# Patient Record
Sex: FEMALE | Race: OTHER
[De-identification: names, ages, dates, MRNs, and addresses within clinical notes are randomized per-mention and may not be internally consistent; named-entity substitution may affect disease eponyms.]

---

## 2021-01-20 ENCOUNTER — HOSPITAL ENCOUNTER (INPATIENT)
Dept: HOSPITAL 72 - EMR | Age: 76
LOS: 3 days | DRG: 871 | End: 2021-01-23
Payer: MEDICARE

## 2021-01-20 VITALS — DIASTOLIC BLOOD PRESSURE: 57 MMHG | SYSTOLIC BLOOD PRESSURE: 110 MMHG

## 2021-01-20 VITALS — SYSTOLIC BLOOD PRESSURE: 116 MMHG | DIASTOLIC BLOOD PRESSURE: 67 MMHG

## 2021-01-20 VITALS — HEIGHT: 67 IN | BODY MASS INDEX: 27.15 KG/M2 | WEIGHT: 173 LBS

## 2021-01-20 VITALS — SYSTOLIC BLOOD PRESSURE: 160 MMHG | DIASTOLIC BLOOD PRESSURE: 89 MMHG

## 2021-01-20 VITALS — SYSTOLIC BLOOD PRESSURE: 151 MMHG | DIASTOLIC BLOOD PRESSURE: 71 MMHG

## 2021-01-20 VITALS — SYSTOLIC BLOOD PRESSURE: 154 MMHG | DIASTOLIC BLOOD PRESSURE: 84 MMHG

## 2021-01-20 VITALS — SYSTOLIC BLOOD PRESSURE: 153 MMHG | DIASTOLIC BLOOD PRESSURE: 85 MMHG

## 2021-01-20 VITALS — SYSTOLIC BLOOD PRESSURE: 129 MMHG | DIASTOLIC BLOOD PRESSURE: 72 MMHG

## 2021-01-20 VITALS — DIASTOLIC BLOOD PRESSURE: 92 MMHG | SYSTOLIC BLOOD PRESSURE: 135 MMHG

## 2021-01-20 VITALS — SYSTOLIC BLOOD PRESSURE: 102 MMHG | DIASTOLIC BLOOD PRESSURE: 64 MMHG

## 2021-01-20 VITALS — SYSTOLIC BLOOD PRESSURE: 110 MMHG | DIASTOLIC BLOOD PRESSURE: 65 MMHG

## 2021-01-20 VITALS — DIASTOLIC BLOOD PRESSURE: 57 MMHG | SYSTOLIC BLOOD PRESSURE: 119 MMHG

## 2021-01-20 VITALS — DIASTOLIC BLOOD PRESSURE: 54 MMHG | SYSTOLIC BLOOD PRESSURE: 141 MMHG

## 2021-01-20 VITALS — DIASTOLIC BLOOD PRESSURE: 76 MMHG | SYSTOLIC BLOOD PRESSURE: 143 MMHG

## 2021-01-20 VITALS — SYSTOLIC BLOOD PRESSURE: 140 MMHG | DIASTOLIC BLOOD PRESSURE: 74 MMHG

## 2021-01-20 DIAGNOSIS — I10: ICD-10-CM

## 2021-01-20 DIAGNOSIS — Z51.5: ICD-10-CM

## 2021-01-20 DIAGNOSIS — Z79.84: ICD-10-CM

## 2021-01-20 DIAGNOSIS — R65.21: ICD-10-CM

## 2021-01-20 DIAGNOSIS — E78.5: ICD-10-CM

## 2021-01-20 DIAGNOSIS — I25.2: ICD-10-CM

## 2021-01-20 DIAGNOSIS — L89.90: ICD-10-CM

## 2021-01-20 DIAGNOSIS — Z66: ICD-10-CM

## 2021-01-20 DIAGNOSIS — F03.90: ICD-10-CM

## 2021-01-20 DIAGNOSIS — J12.82: ICD-10-CM

## 2021-01-20 DIAGNOSIS — A41.89: Primary | ICD-10-CM

## 2021-01-20 DIAGNOSIS — U07.1: ICD-10-CM

## 2021-01-20 DIAGNOSIS — I45.10: ICD-10-CM

## 2021-01-20 DIAGNOSIS — J96.01: ICD-10-CM

## 2021-01-20 DIAGNOSIS — L89.96: ICD-10-CM

## 2021-01-20 LAB
ADD MANUAL DIFF: NO
ALBUMIN SERPL-MCNC: 1.7 G/DL (ref 3.4–5)
ALBUMIN/GLOB SERPL: 0.4 {RATIO} (ref 1–2.7)
ALP SERPL-CCNC: 100 U/L (ref 46–116)
ALT SERPL-CCNC: 19 U/L (ref 12–78)
ANION GAP SERPL CALC-SCNC: 6 MMOL/L (ref 5–15)
APPEARANCE UR: (no result)
APTT BLD: 28 SEC (ref 23–33)
APTT PPP: YELLOW S
AST SERPL-CCNC: 23 U/L (ref 15–37)
BASOPHILS NFR BLD AUTO: 0.4 % (ref 0–2)
BILIRUB SERPL-MCNC: 0.9 MG/DL (ref 0.2–1)
BUN SERPL-MCNC: 20 MG/DL (ref 7–18)
CALCIUM SERPL-MCNC: 8.5 MG/DL (ref 8.5–10.1)
CHLORIDE SERPL-SCNC: 103 MMOL/L (ref 98–107)
CK MB SERPL-MCNC: 2.1 NG/ML (ref 0–3.6)
CK SERPL-CCNC: 44 U/L (ref 26–308)
CO2 SERPL-SCNC: 27 MMOL/L (ref 21–32)
CREAT SERPL-MCNC: 0.8 MG/DL (ref 0.55–1.3)
EOSINOPHIL NFR BLD AUTO: 0.1 % (ref 0–3)
ERYTHROCYTE [DISTWIDTH] IN BLOOD BY AUTOMATED COUNT: 15.6 % (ref 11.6–14.8)
FERRITIN SERPL-MCNC: 660 NG/ML (ref 8–388)
GLOBULIN SER-MCNC: 4.7 G/DL
GLUCOSE UR STRIP-MCNC: NEGATIVE MG/DL
HCT VFR BLD CALC: 32.1 % (ref 37–47)
HGB BLD-MCNC: 10.1 G/DL (ref 12–16)
INR PPP: 1.1 (ref 0.9–1.1)
KETONES UR QL STRIP: NEGATIVE
LDH SERPL L TO P-CCNC: 309 U/L (ref 81–234)
LEUKOCYTE ESTERASE UR QL STRIP: (no result)
LYMPHOCYTES NFR BLD AUTO: 14.1 % (ref 20–45)
MCV RBC AUTO: 89 FL (ref 80–99)
MONOCYTES NFR BLD AUTO: 6.4 % (ref 1–10)
NEUTROPHILS NFR BLD AUTO: 79 % (ref 45–75)
NITRITE UR QL STRIP: NEGATIVE
PH UR STRIP: 6.5 [PH] (ref 4.5–8)
PLATELET # BLD: 281 K/UL (ref 150–450)
POTASSIUM SERPL-SCNC: 4.4 MMOL/L (ref 3.5–5.1)
PROT UR QL STRIP: (no result)
RBC # BLD AUTO: 3.63 M/UL (ref 4.2–5.4)
SODIUM SERPL-SCNC: 136 MMOL/L (ref 136–145)
SP GR UR STRIP: 1.01 (ref 1–1.03)
UROBILINOGEN UR-MCNC: 4 MG/DL (ref 0–1)
WBC # BLD AUTO: 13.3 K/UL (ref 4.8–10.8)

## 2021-01-20 PROCEDURE — 74018 RADEX ABDOMEN 1 VIEW: CPT

## 2021-01-20 PROCEDURE — 87181 SC STD AGAR DILUTION PER AGT: CPT

## 2021-01-20 PROCEDURE — 83550 IRON BINDING TEST: CPT

## 2021-01-20 PROCEDURE — 99291 CRITICAL CARE FIRST HOUR: CPT

## 2021-01-20 PROCEDURE — 93005 ELECTROCARDIOGRAM TRACING: CPT

## 2021-01-20 PROCEDURE — 87081 CULTURE SCREEN ONLY: CPT

## 2021-01-20 PROCEDURE — 94003 VENT MGMT INPAT SUBQ DAY: CPT

## 2021-01-20 PROCEDURE — 85007 BL SMEAR W/DIFF WBC COUNT: CPT

## 2021-01-20 PROCEDURE — 86900 BLOOD TYPING SEROLOGIC ABO: CPT

## 2021-01-20 PROCEDURE — 82728 ASSAY OF FERRITIN: CPT

## 2021-01-20 PROCEDURE — 93306 TTE W/DOPPLER COMPLETE: CPT

## 2021-01-20 PROCEDURE — 83690 ASSAY OF LIPASE: CPT

## 2021-01-20 PROCEDURE — 83605 ASSAY OF LACTIC ACID: CPT

## 2021-01-20 PROCEDURE — 80053 COMPREHEN METABOLIC PANEL: CPT

## 2021-01-20 PROCEDURE — 80048 BASIC METABOLIC PNL TOTAL CA: CPT

## 2021-01-20 PROCEDURE — 86920 COMPATIBILITY TEST SPIN: CPT

## 2021-01-20 PROCEDURE — 96375 TX/PRO/DX INJ NEW DRUG ADDON: CPT

## 2021-01-20 PROCEDURE — 83615 LACTATE (LD) (LDH) ENZYME: CPT

## 2021-01-20 PROCEDURE — 0BH17EZ INSERTION OF ENDOTRACHEAL AIRWAY INTO TRACHEA, VIA NATURAL OR ARTIFICIAL OPENING: ICD-10-PCS

## 2021-01-20 PROCEDURE — 5A09357 ASSISTANCE WITH RESPIRATORY VENTILATION, LESS THAN 24 CONSECUTIVE HOURS, CONTINUOUS POSITIVE AIRWAY PRESSURE: ICD-10-PCS

## 2021-01-20 PROCEDURE — 85610 PROTHROMBIN TIME: CPT

## 2021-01-20 PROCEDURE — 87040 BLOOD CULTURE FOR BACTERIA: CPT

## 2021-01-20 PROCEDURE — 85379 FIBRIN DEGRADATION QUANT: CPT

## 2021-01-20 PROCEDURE — 85730 THROMBOPLASTIN TIME PARTIAL: CPT

## 2021-01-20 PROCEDURE — 83540 ASSAY OF IRON: CPT

## 2021-01-20 PROCEDURE — 83735 ASSAY OF MAGNESIUM: CPT

## 2021-01-20 PROCEDURE — 36415 COLL VENOUS BLD VENIPUNCTURE: CPT

## 2021-01-20 PROCEDURE — 87086 URINE CULTURE/COLONY COUNT: CPT

## 2021-01-20 PROCEDURE — 84484 ASSAY OF TROPONIN QUANT: CPT

## 2021-01-20 PROCEDURE — 84100 ASSAY OF PHOSPHORUS: CPT

## 2021-01-20 PROCEDURE — 83880 ASSAY OF NATRIURETIC PEPTIDE: CPT

## 2021-01-20 PROCEDURE — 81003 URINALYSIS AUTO W/O SCOPE: CPT

## 2021-01-20 PROCEDURE — 82550 ASSAY OF CK (CPK): CPT

## 2021-01-20 PROCEDURE — 82803 BLOOD GASES ANY COMBINATION: CPT

## 2021-01-20 PROCEDURE — 71045 X-RAY EXAM CHEST 1 VIEW: CPT

## 2021-01-20 PROCEDURE — 5A1945Z RESPIRATORY VENTILATION, 24-96 CONSECUTIVE HOURS: ICD-10-PCS

## 2021-01-20 PROCEDURE — 86850 RBC ANTIBODY SCREEN: CPT

## 2021-01-20 PROCEDURE — 86901 BLOOD TYPING SEROLOGIC RH(D): CPT

## 2021-01-20 PROCEDURE — 86140 C-REACTIVE PROTEIN: CPT

## 2021-01-20 PROCEDURE — 86710 INFLUENZA VIRUS ANTIBODY: CPT

## 2021-01-20 PROCEDURE — 82553 CREATINE MB FRACTION: CPT

## 2021-01-20 PROCEDURE — 96365 THER/PROPH/DIAG IV INF INIT: CPT

## 2021-01-20 PROCEDURE — 96367 TX/PROPH/DG ADDL SEQ IV INF: CPT

## 2021-01-20 PROCEDURE — 94002 VENT MGMT INPAT INIT DAY: CPT

## 2021-01-20 PROCEDURE — 85025 COMPLETE CBC W/AUTO DIFF WBC: CPT

## 2021-01-20 RX ADMIN — DEXTROSE AND SODIUM CHLORIDE SCH MLS/HR: 5; .45 INJECTION, SOLUTION INTRAVENOUS at 15:51

## 2021-01-20 NOTE — NUR
NURSE NOTES:

I spoke to Awa Claudio she wants full code patient,to do everything to save her,I 
notified Dr. Bruno patients condition ,tacypneic,condition unstable,O2 saturation 40 %,per 
Dr. Bruno if ABG result needs intubation,intubate patient and transfer ICU,RRT was called 
[facility] ICU RN called ED doctor to intubate patient,notified Doctor Damion Awa Gonzales 
wants full code

-------------------------------------------------------------------------------

Addendum: 01/20/21 at 1720 by Layla Tomas RN

-------------------------------------------------------------------------------

Awa Gonzales notified patients condition

## 2021-01-20 NOTE — NUR
TRANSFER TO FLOOR:



Patient transferred to SDU via gurney, accompanied by 2 RNs. Pt is on BIPAP, 
tolerating well, not in any distress. IV line on right forearm 20g patent adn 
intact. Swabs are sent. Pt has no belongings.

## 2021-01-20 NOTE — NUR
NURSE NOTES:

RT came at bedside and titrated FiO2 from 100% to 80%. O2 saturation 100%. will monitor 
closely

## 2021-01-20 NOTE — CONSULTATION
Consult Note


Consult Note


DATE OF CONSULTATION: 1/20/2021


CONSULTING PHYSICIAN:  Albino Ventura MD.





ATTENDING PHYSICIAN: Dr. Bruno





REASON FOR CONSULTATION: COVID-19, bilateral infiltrates on chest x-ray





HISTORY OF PRESENT ILLNESS: This is a 75-year-old female who was sent to ED from

[facility] for evaluation of increased difficulty breathing and hypoxia.  She has been 

sent in from [facility].  She has medical history of unstageable 

pressure sores, hypertension, dementia, psychosis and hyperlipidemia.  Further 

history is limited given her underlying psychiatric illnesses.  Much of the 

information is obtained from ER note and EMR.


It was reported that she tested positive for COVID-19 infection 10 days ago.  

She arrived hypoxic despite placement of nonrebreather by EMS during 

transportation.  Currently she is on BiPAP saturating at 100%.  Her ABG shows 

respiratory alkalosis.





Her chest x-ray is notable for bilateral infiltrates.


She was given IV fluids and IV antibiotics empirically.  She was also given 

Decadron given hypoxia.  He is now admitted to the hospital.





 PAST MEDICAL HISTORY: Unstageable pressure sores, hypertension, dementia, 

psychosis, and hyperlipidemia





MEDICATIONS: Alprazolam, amlodipine, atorvastatin, citalopram, donezepil, 

Metformin, olanzapine





ALLERGIES: No known allergies





FAMILY HISTORY: Unknown





PERSONAL/SOCIAL HISTORY: Resident of [facility]





REVIEW OF SYSTEMS: Unreliable





PHYSICAL EXAMINATION:


VITAL SIGNS:  Blood pressure 143/76, heart rate 84, respiratory rate 23,


weight 93 kg, height 170 cm


General: Patient appears NAD on BiPAP, with normal work of breathing


HEENT:  Head exam reveals that the head is normocephalic, atraumatic


without deformity or unusual swelling.  Pupils are PERRLA.


CHEST AND LUNGS: Rhonchi noted CARDIOVASCULAR:  Reveals normal S1, S2 without 

murmurs, rubs, or clicks.


ABDOMEN:  Soft with no tenderness or organomegaly.


RECTAL:  Deferred.


MUSCULOSKELETAL:  There is no tenderness to palpation.  Range of motion is


normal.


NEUROLOGICAL:  Alert and oriented x3 , nonfocal





LABORATORY DATA:  


Laboratory testing shows WBC 13.3, hemoglobin 10.1, hematocrit 32.1.





Chemistries show BUN 20, glucose 154, ferritin 660, , troponin 0.059, CRP

14.0, BNP 8374, albumin 1.7


Urinalysis shows 2+ protein, 4+ blood, 1+ leuk esterase, presence of yeast and 

bacteria in urine


ABG shows pH 7.5, PCO2 33.9, PO2 74.3


Assessment/Plan


1. COVID-19 pneumonia with hypoxia


   -We will continue broad-spectrum antibiotics for pneumonia coverage


   -We will continue with dexamethasone given hypoxia


   -Currently on BiPAP saturating at 100%


   


2. Elevated inflammatory markers


   -We will initiate Lovenox for DVT prophylaxis


3. Respiratory alkalosis


   -Now on BiPAP


   -Check ABG and labs


Leukocytosis


Anemia


Hyperglycemia


Troponin leak


Elevated BNP


OTTO


    - Management per primary MD





DNR/DNI





Now in [facility]ORI


The care for this patient was discussed with my supervising physician.


Time spent for this case was approximately 31 minutes.











Ankit Todd                 Jan 20, 2021 12:09

## 2021-01-20 NOTE — NUR
NURSE NOTES:

contacted Dr. Bruno and updated regarding patient's ABG. PH 7.479, PCO2 28.3, PO2 163.2, 
HCO3 20.6. Also updated patient's vent settings AC 16  FiO2 80% PEEP 5. Dr. Bruno 
aware and acknowledged. Ordered to change TV from 500 to 400 and FiO2 from 80% to 70%. will 
carry out orders.

## 2021-01-20 NOTE — CARDIAC ELECTROPHYSIOLOGY PN
Subjective


Subjective


[id number]





Objective





Last 24 Hour Vital Signs








  Date Time  Temp Pulse Resp B/P (MAP) Pulse Ox O2 Delivery O2 Flow Rate FiO2


 


1/20/21 11:58  75      


 


1/20/21 11:20  67 17  97 Bi-Pap  100


 


1/20/21 11:20  67 17  97   100


 


1/20/21 11:15 98.3 84 23 143/76 100 Bi-pap 15.0 100


 


1/20/21 11:02 98.3 84 23 143/76 100 Bi-pap 15.0 100


 


1/20/21 07:35 98.3 91 22 151/71 100 Bi-pap  100


 


1/20/21 07:23  95 23  99   100


 


1/20/21 06:54 98.3 94 25 154/84 100 Room Air  100


 


1/20/21 05:42 98.4 118 28 160/89 100 Bi-pap  100


 


1/20/21 04:38 98.6 115 30 141/54 100 Bi-pap  


 


1/20/21 04:11 98.4       


 


1/20/21 04:00  115 26  97   100


 


1/20/21 03:54 99.4 110 36 153/85 92 Non-Rebreather 15.0 


 


1/20/21 03:54  110 36   Non-Rebreather 15.0 


 


1/20/21 03:23 99.7 130 28  90 Non-Rebreather  

















Intake and Output  


 


 1/19/21 1/20/21





 19:00 07:00


 


Intake Total  610 ml


 


Balance  610 ml


 


  


 


Intake IV Total  610 ml











Laboratory Tests








Test


 1/20/21


03:45 1/20/21


03:47 1/20/21


14:45


 


White Blood Count


 13.3 K/UL


(4.8-10.8)  H 


 





 


Red Blood Count


 3.63 M/UL


(4.20-5.40)  L 


 





 


Hemoglobin


 10.1 G/DL


(12.0-16.0)  L 


 





 


Hematocrit


 32.1 %


(37.0-47.0)  L 


 





 


Mean Corpuscular Volume 89 FL (80-99)    


 


Mean Corpuscular Hemoglobin


 27.9 PG


(27.0-31.0) 


 





 


Mean Corpuscular Hemoglobin


Concent 31.5 G/DL


(32.0-36.0)  L 


 





 


Red Cell Distribution Width


 15.6 %


(11.6-14.8)  H 


 





 


Platelet Count


 281 K/UL


(150-450) 


 





 


Mean Platelet Volume


 7.2 FL


(6.5-10.1) 


 





 


Neutrophils (%) (Auto)


 79.0 %


(45.0-75.0)  H 


 





 


Lymphocytes (%) (Auto)


 14.1 %


(20.0-45.0)  L 


 





 


Monocytes (%) (Auto)


 6.4 %


(1.0-10.0) 


 





 


Eosinophils (%) (Auto)


 0.1 %


(0.0-3.0) 


 





 


Basophils (%) (Auto)


 0.4 %


(0.0-2.0) 


 





 


Prothrombin Time


 12.0 SEC


(9.30-11.50)  H 


 





 


Prothromb Time International


Ratio 1.1 (0.9-1.1)  


 


 





 


Activated Partial


Thromboplast Time 28 SEC (23-33)


 


 





 


D-Dimer


 6.00 mg/L FEU


(0.00-0.49)  H 


 





 


Urine Color Yellow    


 


Urine Appearance


 Slightly


cloudy 


 





 


Urine pH 6.5 (4.5-8.0)    


 


Urine Specific Gravity


 1.015


(1.005-1.035) 


 





 


Urine Protein


 2+ (NEGATIVE)


H 


 





 


Urine Glucose (UA)


 Negative


(NEGATIVE) 


 





 


Urine Ketones


 Negative


(NEGATIVE) 


 





 


Urine Blood


 4+ (NEGATIVE)


H 


 





 


Urine Nitrite


 Negative


(NEGATIVE) 


 





 


Urine Bilirubin


 Negative


(NEGATIVE) 


 





 


Urine Urobilinogen


 4 MG/DL


(0.0-1.0)  H 


 





 


Urine Leukocyte Esterase


 1+ (NEGATIVE)


H 


 





 


Urine RBC


 20-30 /HPF (0


- 2)  H 


 





 


Urine WBC


 5-10 /HPF (0 -


2)  H 


 





 


Urine Squamous Epithelial


Cells Moderate /LPF


(NONE/OCC)  H 


 





 


Urine Bacteria


 Moderate /HPF


(NONE)  H 


 





 


Urine Yeast


 Many /HPF


(NONE)  H 


 





 


Sodium Level


 136 MMOL/L


(136-145) 


 





 


Potassium Level


 4.4 MMOL/L


(3.5-5.1) 


 





 


Chloride Level


 103 MMOL/L


() 


 





 


Carbon Dioxide Level


 27 MMOL/L


(21-32) 


 





 


Anion Gap


 6 mmol/L


(5-15) 


 





 


Blood Urea Nitrogen


 20 mg/dL


(7-18)  H 


 





 


Creatinine


 0.8 MG/DL


(0.55-1.30) 


 





 


Estimat Glomerular Filtration


Rate > 60 mL/min


(>60) 


 





 


Glucose Level


 154 MG/DL


()  H 


 





 


Lactic Acid Level


 1.40 mmol/L


(0.4-2.0) 


 





 


Calcium Level


 8.5 MG/DL


(8.5-10.1) 


 





 


Magnesium Level


 1.5 MG/DL


(1.8-2.4)  L 


 Pending  





 


Ferritin


 660 NG/ML


(8-388)  H 


 





 


Total Bilirubin


 0.9 MG/DL


(0.2-1.0) 


 





 


Aspartate Amino Transf


(AST/SGOT) 23 U/L (15-37)


 


 





 


Alanine Aminotransferase


(ALT/SGPT) 19 U/L (12-78)


 


 





 


Alkaline Phosphatase


 100 U/L


() 


 





 


Lactate Dehydrogenase


 309 U/L


()  H 


 





 


Total Creatine Kinase


 44 U/L


() 


 





 


Creatine Kinase MB


 2.1 NG/ML


(0.0-3.6) 


 





 


Creatine Kinase MB Relative


Index 4.7  


 


 





 


Troponin I


 0.059 ng/mL


(0.000-0.056) 


 





 


C-Reactive Protein,


Quantitative 14.0 mg/dL


(0.00-0.90)  H 


 





 


Pro-B-Type Natriuretic Peptide


 8374 pg/mL


(0-125)  H 


 





 


Total Protein


 6.4 G/DL


(6.4-8.2) 


 





 


Albumin


 1.7 G/DL


(3.4-5.0)  L 


 





 


Globulin 4.7 g/dL    


 


Albumin/Globulin Ratio


 0.4 (1.0-2.7)


L 


 





 


Lipase


 252 U/L


() 


 





 


Arterial Blood pH


 


 7.500


(7.350-7.450) 





 


Arterial Blood Partial


Pressure CO2 


 33.9 mmHg


(35.0-45.0)  L 





 


Arterial Blood Partial


Pressure O2 


 74.3 mmHg


(75.0-100.0)  L 





 


Arterial Blood HCO3


 


 25.8 mmol/L


(22.0-26.0) 





 


Arterial Blood Oxygen


Saturation 


 94.4 %


()  L 





 


Arterial Blood Base Excess  2.8 (-2-2)  H 


 


Steve Test  Positive   











Microbiology








 Date/Time


Source Procedure


Growth Status





 


 1/20/21 03:45


Nasal Nares - Final Complete


 


 1/20/21 03:45


Nasal Nares - Final Complete

















Meño Capone MD               Jan 20, 2021 14:57

## 2021-01-20 NOTE — NUR
NURSE NOTES:

Pt O2 saturation decrease to 89. RT was called to assess the pt. BP is 135/92, , RR 
was 36. Pt was hard to awaken. Dr. Bruno was contacted and informed. RRT was called. ED MD 
intubated the pt. Pt was then transferred to ICU. Gave report to Amy España. Endorsed plan of 
care.

## 2021-01-20 NOTE — NUR
ED Nurse Note: Pt is resting comfortably, eyes closed, breathing is much less 
labored with the bipap, frequent PVCs on tele monitor, ER MD aware, started Mag 
infusion. Vitals stable as documented.

## 2021-01-20 NOTE — CONSULTATION
DATE OF CONSULTATION:  01/20/2021

CARDIOLOGY CONSULTATION



CONSULTING PHYSICIAN:  Meño Capone M.D.



REASON FOR CONSULTATION:  Tachycardia and bundle-branch block and old

inferior wall MI in a patient with COVID pneumonia.



HISTORY OF PRESENT ILLNESS:  The patient is a 75-year-old lady who was

brought in from a nursing home for desaturation from [facility].  The patient has prior history of coronavirus infection about 10

days ago and had diminished oxygen saturation and was started on

nonrebreather by the paramedics.  The patient also has dementia,

psychosis, and hyperlipidemia.  The patient also is noted to be

tachycardic with heart rate of 114 and a cardiology consultation was

obtained for further evaluation.



REVIEW OF SYSTEMS:  Cannot be obtained.



PAST MEDICAL HISTORY:  As mentioned above.



FAMILY HISTORY:  Noncontributory.



SOCIAL HISTORY:  Lives in a nursing home.



PHYSICAL EXAMINATION:

VITAL SIGNS:  Blood pressure of 143/76, pulse is 70, respirations 18, and

temperature 98.3.

HEAD AND NECK:  No JVD.

LUNGS:  Coarse rhonchi.

CARDIOVASCULAR:  Regular S1 and S2 with no gallop.

ABDOMEN:  Soft.

EXTREMITIES:  No pitting edema.



LABORATORY AND DIAGNOSTIC DATA:  Labs show white count of 13.2, hemoglobin

of 10, hematocrit 32, and platelet count of 281.  Sodium _____, potassium

4.4, BUN of 20, creatinine of 0.8.  Troponin 0.059.



ASSESSMENT AND PLAN:

1. Elevated troponin of 0.059.  The patient does not have renal failure.

This could be due to COVID pneumonia.  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

myocardial infarction and right bundle-branch block.  I will start the

patient on aspirin and metoprolol and Lipitor.  I will get an

echocardiogram for further evaluation.

2. Right bundle-branch block.

3. Sinus tachycardia at 113 due to the patient's COVID pneumonia.

4. COVID pneumonia, currently on BiPAP as well as dexamethasone,

azithromycin, and ceftriaxone.



Thank you very much for allowing me to participate in the care of this

patient.  Please do not hesitate to contact me if you have any questions

regarding my evaluation.









  ______________________________________________

  Meño Capone M.D.





DR:  GASTON

D:  01/20/2021 14:59

T:  01/20/2021 20:15

JOB#:  [id number]

CC:

## 2021-01-20 NOTE — HISTORY & PHYSICAL
History of Present Illness


General


Reason for Hospitalization:  Dyspnea/Respdistress





Present Illness


HPI


75-year-old female who was sent to ED from SNF for evaluation of increased 

difficulty breathing and hypoxia.  She has been sent in from [facility].  She has medical history of unstageable pressure sores, hypertension, 

dementia, psychosis and hyperlipidemia.  Further history is limited given her 

underlying psychiatric illnesses.  Much of the information is obtained from ER 

note and EMR.


It was reported that she tested positive for COVID-19 infection 10 days ago.  

She arrived hypoxic despite placement of nonrebreather by EMS during 

transportation.  Currently she is on BiPAP saturating at 100%.  Her ABG shows 

respiratory alkalosis.





Her chest x-ray is notable for bilateral infiltrates.


She was given IV fluids and IV antibiotics empirically.  She was also given 

Decadron given hypoxia.  He is now admitted to the hospital.





 PAST MEDICAL HISTORY: Unstageable pressure sores, hypertension, dementia, 

psychosis, and hyperlipidemia





MEDICATIONS: Alprazolam, amlodipine, atorvastatin, citalopram, donezepil, 

Metformin, olanzapine





ALLERGIES: No known allergies





FAMILY HISTORY: Unknown





PERSONAL/SOCIAL HISTORY: Resident of [facility]





REVIEW OF SYSTEMS: Unreliable


Allergies:  


Coded Allergies:  


     No Known Allergies (Unverified , 1/20/21)





COVID-19 Screening


Contact w/high risk pt:  Yes


Experienced COVID-19 symptoms?:  Yes


Coronavirus symptoms experienc:  Shortness of Breath





Medication History


Scheduled


Amlodipine Besylate* (Amlodipine Besylate*), 5 MG ORAL DAILY, (Reported)


Atorvastatin Calcium* (Atorvastatin Calcium*), 10 MG ORAL BEDTIME, (Reported)


Citalopram Hydrobromide* (Citalopram Hbr*), 20 MG ORAL DAILY, (Reported)


Donepezil Hcl* (Donepezil Hcl*), 5 MG ORAL DAILY, (Reported)


Metformin Hcl* (Metformin Hcl*), 500 MG ORAL TWICE A DAY, (Reported)


Olanzapine Zydis* (Zyprexa Zydis*), 5 MG ORAL BEDTIME, (Reported)





Scheduled PRN


Alprazolam* (Xanax*), 0.25 MG ORAL BEDTIME PRN for PRN Agitation/Anxiety, 

(Reported)





Patient History


Healthcare decision maker





Resuscitation status





Advanced Directive on File








Review of Systems


Review of Symptoms


unable to obtain due to clinical condition





Physical Exam


Physical Exam


General appearance:  intubated 


Head:  Normocephalic, without obvious abnormality, atraumatic


Eyes:  conjunctivae/corneas clear. PERRL, EOM's intac


Throat:  Lips, mucosa, and tongue normal. Teeth and gums normal


Neck:  supple, symmetrical, trachea midline, no adenopathy, thyroid: not 

enlarged, symmetric, no tenderness/mass/nodules, no carotid bruit and no JVD


Lungs:  clear to auscultation bilaterally


Heart:  regular rate and rhythm, S1, S2 normal, no murmur, click, rub or gallop


Abdomen:  soft, non-tender. Bowel sounds normal. No masses,  no organomegaly


Extremities:  extremities normal, atraumatic, no cyanosis or edema


Pulses:  2+ and symmetric





Last 24 Hour Vital Signs








  Date Time  Temp Pulse Resp B/P (MAP) Pulse Ox O2 Delivery O2 Flow Rate FiO2


 


1/20/21 07:35 98.3 91 22 151/71 100 Bi-pap  100


 


1/20/21 07:23  95 23  99   100


 


1/20/21 06:54 98.3 94 25 154/84 100 Room Air  100


 


1/20/21 05:42 98.4 118 28 160/89 100 Bi-pap  100


 


1/20/21 04:38 98.6 115 30 141/54 100 Bi-pap  


 


1/20/21 04:11 98.4       


 


1/20/21 04:00  115 26  97   100


 


1/20/21 03:54 99.4 110 36 153/85 92 Non-Rebreather 15.0 


 


1/20/21 03:54  110 36   Non-Rebreather 15.0 


 


1/20/21 03:23 99.7 130 28  90 Non-Rebreather  

















Intake and Output  


 


 1/19/21 1/20/21





 19:00 07:00


 


Intake Total  610 ml


 


Balance  610 ml


 


  


 


Intake IV Total  610 ml











Laboratory Tests








Test


 1/20/21


03:45 1/20/21


03:47


 


White Blood Count


 13.3 K/UL


(4.8-10.8)  H 





 


Red Blood Count


 3.63 M/UL


(4.20-5.40)  L 





 


Hemoglobin


 10.1 G/DL


(12.0-16.0)  L 





 


Hematocrit


 32.1 %


(37.0-47.0)  L 





 


Mean Corpuscular Volume 89 FL (80-99)   


 


Mean Corpuscular Hemoglobin


 27.9 PG


(27.0-31.0) 





 


Mean Corpuscular Hemoglobin


Concent 31.5 G/DL


(32.0-36.0)  L 





 


Red Cell Distribution Width


 15.6 %


(11.6-14.8)  H 





 


Platelet Count


 281 K/UL


(150-450) 





 


Mean Platelet Volume


 7.2 FL


(6.5-10.1) 





 


Neutrophils (%) (Auto)


 79.0 %


(45.0-75.0)  H 





 


Lymphocytes (%) (Auto)


 14.1 %


(20.0-45.0)  L 





 


Monocytes (%) (Auto)


 6.4 %


(1.0-10.0) 





 


Eosinophils (%) (Auto)


 0.1 %


(0.0-3.0) 





 


Basophils (%) (Auto)


 0.4 %


(0.0-2.0) 





 


Prothrombin Time


 12.0 SEC


(9.30-11.50)  H 





 


Prothromb Time International


Ratio 1.1 (0.9-1.1)  


 





 


Activated Partial


Thromboplast Time 28 SEC (23-33)


 





 


D-Dimer


 6.00 mg/L FEU


(0.00-0.49)  H 





 


Urine Color Yellow   


 


Urine Appearance


 Slightly


cloudy 





 


Urine pH 6.5 (4.5-8.0)   


 


Urine Specific Gravity


 1.015


(1.005-1.035) 





 


Urine Protein


 2+ (NEGATIVE)


H 





 


Urine Glucose (UA)


 Negative


(NEGATIVE) 





 


Urine Ketones


 Negative


(NEGATIVE) 





 


Urine Blood


 4+ (NEGATIVE)


H 





 


Urine Nitrite


 Negative


(NEGATIVE) 





 


Urine Bilirubin


 Negative


(NEGATIVE) 





 


Urine Urobilinogen


 4 MG/DL


(0.0-1.0)  H 





 


Urine Leukocyte Esterase


 1+ (NEGATIVE)


H 





 


Urine RBC


 20-30 /HPF (0


- 2)  H 





 


Urine WBC


 5-10 /HPF (0 -


2)  H 





 


Urine Squamous Epithelial


Cells Moderate /LPF


(NONE/OCC)  H 





 


Urine Bacteria


 Moderate /HPF


(NONE)  H 





 


Urine Yeast


 Many /HPF


(NONE)  H 





 


Sodium Level


 136 MMOL/L


(136-145) 





 


Potassium Level


 4.4 MMOL/L


(3.5-5.1) 





 


Chloride Level


 103 MMOL/L


() 





 


Carbon Dioxide Level


 27 MMOL/L


(21-32) 





 


Anion Gap


 6 mmol/L


(5-15) 





 


Blood Urea Nitrogen


 20 mg/dL


(7-18)  H 





 


Creatinine


 0.8 MG/DL


(0.55-1.30) 





 


Estimat Glomerular Filtration


Rate > 60 mL/min


(>60) 





 


Glucose Level


 154 MG/DL


()  H 





 


Lactic Acid Level


 1.40 mmol/L


(0.4-2.0) 





 


Calcium Level


 8.5 MG/DL


(8.5-10.1) 





 


Magnesium Level


 1.5 MG/DL


(1.8-2.4)  L 





 


Ferritin


 660 NG/ML


(8-388)  H 





 


Total Bilirubin


 0.9 MG/DL


(0.2-1.0) 





 


Aspartate Amino Transf


(AST/SGOT) 23 U/L (15-37)


 





 


Alanine Aminotransferase


(ALT/SGPT) 19 U/L (12-78)


 





 


Alkaline Phosphatase


 100 U/L


() 





 


Lactate Dehydrogenase


 309 U/L


()  H 





 


Total Creatine Kinase


 44 U/L


() 





 


Creatine Kinase MB


 2.1 NG/ML


(0.0-3.6) 





 


Creatine Kinase MB Relative


Index 4.7  


 





 


Troponin I


 0.059 ng/mL


(0.000-0.056) 





 


C-Reactive Protein,


Quantitative 14.0 mg/dL


(0.00-0.90)  H 





 


Pro-B-Type Natriuretic Peptide


 8374 pg/mL


(0-125)  H 





 


Total Protein


 6.4 G/DL


(6.4-8.2) 





 


Albumin


 1.7 G/DL


(3.4-5.0)  L 





 


Globulin 4.7 g/dL   


 


Albumin/Globulin Ratio


 0.4 (1.0-2.7)


L 





 


Lipase


 252 U/L


() 





 


Arterial Blood pH


 


 7.500


(7.350-7.450)


 


Arterial Blood Partial


Pressure CO2 


 33.9 mmHg


(35.0-45.0)  L


 


Arterial Blood Partial


Pressure O2 


 74.3 mmHg


(75.0-100.0)  L


 


Arterial Blood HCO3


 


 25.8 mmol/L


(22.0-26.0)


 


Arterial Blood Oxygen


Saturation 


 94.4 %


()  L


 


Arterial Blood Base Excess  2.8 (-2-2)  H


 


Steve Test  Positive  











Microbiology








 Date/Time


Source Procedure


Growth Status





 


 1/20/21 03:45


Nasal Nares - Final Complete


 


 1/20/21 03:45


Nasal Nares - Final Complete








Height (Feet):  5


Height (Inches):  7.00


Weight (Pounds):  205





Assessment/Plan


Diagnosis


Axis I:


#Hypoxemic resp failure due to covid pneumonia


#Covid pneumonia


#Septic shock


#HLD


#h/o hypertension


#h/o dementia


#anemia





- admit to ICU


- intubated 


- vent management per ICU


- on azithro and ceftriaxone


- IVF


- on dex


- defer remdesevir to ID


- ID eval


- cardiology eval


- gen surg eval for decub ulcer 


- monitor lytes 


- monitor hemoglobin





[facility]


Hospital declaration








I spent 70 minutes on this patient's case, and35 minutes was dedicated to co

unseling and/or care coordination. 








MIPS ([employer] System) 


Applicable CPT: 38671, 13529





CHECK ALL THAT ARE MET:





  Measure #5 (CHF): All ages. Prescribe ACE/ARB upon discharge for patients with

left ventricular systolic dysfunction. If not, the reason is clearly documented 

in the medical chart.





  Measure #8 (CHF): All ages. Prescribe a beta blocker upon discharge for 

patients with left ventricular systolic dysfunction. If not, the reason is 

clearly documented in the medical chart.





  Measure #47 Advance care plan or surrogate decision maker documented in the 

medical record. 





  Measure #130 The provider has documented, updated, or reviewed the patients 

current medication list and has documented it in the patients note.  





  Measure #374 (All): Send report to referring provider. 





  Measure #407(Sepsis due to MSSA bacteremia): Age 18+ Patient treated with a 

beta-lactam antibiotic (Nafcillin, Oxacillin or Cefazolin) as definitive 

therapy. 








MEDICAL COMPLEXITY


High complexity medical decision making (need 2/3 categories)





Problem - need 4 points


  Acute/new problem with new plan for workup (4 points, 1 max)


  Acute/new problem without additional workup (3 points, 1 max)


  Unstable chronic problem actively being managed (2 point each, 2 max)


  Stable chronic problem actively being managed (1 point each, 2 max)


  Self-limited/transient process (constipation, muscle ache, etc) (1 point each,

2 max)


 





Data - need 4 points


  Reviewed labs/imaging studies (1 points, 2 max)


  Independent review of imaging (EKG, xrays, etc) (2 points, 2 max)


  Discussed case with consult/other MD/RN (2 points, 2 max)





High Risk - qualify if have one of the following:


  Severe exacerbation of acute problem, acute mental status change, IV 

narcotics, monitoring drug levels (vancomycin, INR, tacrolimus etc)











Shyla Bruno M.D.              Jan 20, 2021 09:40

## 2021-01-20 NOTE — DIAGNOSTIC IMAGING REPORT
Indication: Shortness of breath

 

Technique: XRAY Chest 1v

 

Comparison: None

 

Findings: Extensive bilateral infiltrates, right greater than left. No appreciable

pneumothorax. Small pleural effusion is not excluded. No appreciable acute osseous

abnormality. Note that evaluation limited due to suboptimal positioning.

 

Impression: Extensive bilateral infiltrates which may be related to multifocal

pneumonia, including COVID pneumonia. Please correlate clinically.

## 2021-01-20 NOTE — CONSULTATION
History of Present Illness


General


Date patient seen:  Jan 20, 2021


Reason for Hospitalization:  Dyspnea/Respdistress





Present Illness


HPI


75-year-old female who presents for increased difficulty with breathing and 

decreased oxygen saturation.  Patient been sent in from [facility]. 

Had prior history of coronavirus infection with positive test 10 days ago.  

Patient had diminished oxygen saturation and had been started on nonrebreather 

by EMS.  Prior history of unstageable pressure sores, hypertension, dementia, 

psychosis and hyperlipidemia.  History is markedly limited by patient's mental 

status. surgery called to evaluate and assist with care.


Allergies:  


Coded Allergies:  


     No Known Allergies (Unverified , 1/20/21)





COVID-19 Screening


Contact w/high risk pt:  Yes


Experienced COVID-19 symptoms?:  Yes


Coronavirus symptoms experienc:  Shortness of Breath





Medication History


Scheduled


Amlodipine Besylate* (Amlodipine Besylate*), 5 MG ORAL DAILY, (Reported)


Atorvastatin Calcium* (Atorvastatin Calcium*), 10 MG ORAL BEDTIME, (Reported)


Citalopram Hydrobromide* (Citalopram Hbr*), 20 MG ORAL DAILY, (Reported)


Donepezil Hcl* (Donepezil Hcl*), 5 MG ORAL DAILY, (Reported)


Metformin Hcl* (Metformin Hcl*), 500 MG ORAL TWICE A DAY, (Reported)


Olanzapine Zydis* (Zyprexa Zydis*), 5 MG ORAL BEDTIME, (Reported)





Scheduled PRN


Alprazolam* (Xanax*), 0.25 MG ORAL BEDTIME PRN for PRN Agitation/Anxiety, 

(Reported)





Patient History


Limited by:  age, medical condition


History Provided By:  Medical Record, PMD


Healthcare decision maker





Resuscitation status





Advanced Directive on File








Past Medical/Surgical History


Past Medical/Surgical History:  


(1) Decubitus skin ulcer


(2) Hypoxia


(3) Bilateral pneumonia


(4) Coronavirus infection, unspecified





Review of Systems


Review of Symptoms


General ROS: no weight loss or fever


Psychological ROS: no depression or mood changes, no memory loss


Ophthalmic ROS: no visual changes or eye irritation


ENT ROS: no nasal congestion, hearing loss, dizziness


Allergy and Immunology ROS: no allergic symptoms or urticaria


Hematological and Lymphatic ROS: no swollen glands, unusual bleeding or bruising


Endocrine ROS: no polyuria, polydipsia, weight changes, temperature intolerance


Respiratory ROS: no cough, shortness of breath, or wheezing


Cardiovascular ROS: no chest pain or dyspnea on exertion


Gastrointestinal ROS: denies abdominal pain, bright red blood in stool.


Musculoskeletal ROS: no myalgias or arthralgias


Neurological ROS: no TIA or stroke symptoms


Dermatological ROS: no new or changing skin lesions, rashes or pruritis


limited given condition





Physical Exam


Physical Exam


General appearance:  no distress, appears stated age


Head:  Normocephalic, without obvious abnormality, atraumatic


Eyes:  conjunctivae/corneas clear. PERRL, EOM's intact. Fundi benign


Throat:  Lips, mucosa, and tongue normal. Teeth and gums normal


Neck:  supple, symmetrical, trachea midline, no adenopathy, thyroid: not 

enlarged, symmetric, no tenderness/mass/nodules, no carotid bruit and no JVD


Lungs:  clear to auscultation bilaterally


Heart:  regular rate and rhythm, S1, S2 normal, no murmur, click, rub or gallop


Abdomen:  soft, non-tender. Bowel sounds normal. No masses,  no organomegaly


Extremities:  extremities normal, atraumatic, no cyanosis or edema


Pulses:  2+ and symmetric


Skin:  Skin see below 


Neurologic:  Grossly normal





Last 24 Hour Vital Signs








  Date Time  Temp Pulse Resp B/P (MAP) Pulse Ox O2 Delivery O2 Flow Rate FiO2


 


1/20/21 11:15 98.3 84 23 143/76 100 Bi-pap 15.0 100


 


1/20/21 11:02 98.3 84 23 143/76 100 Bi-pap 15.0 100


 


1/20/21 07:35 98.3 91 22 151/71 100 Bi-pap  100


 


1/20/21 07:23  95 23  99   100


 


1/20/21 06:54 98.3 94 25 154/84 100 Room Air  100


 


1/20/21 05:42 98.4 118 28 160/89 100 Bi-pap  100


 


1/20/21 04:38 98.6 115 30 141/54 100 Bi-pap  


 


1/20/21 04:11 98.4       


 


1/20/21 04:00  115 26  97   100


 


1/20/21 03:54 99.4 110 36 153/85 92 Non-Rebreather 15.0 


 


1/20/21 03:54  110 36   Non-Rebreather 15.0 


 


1/20/21 03:23 99.7 130 28  90 Non-Rebreather  

















Intake and Output  


 


 1/19/21 1/20/21





 19:00 07:00


 


Intake Total  610 ml


 


Balance  610 ml


 


  


 


Intake IV Total  610 ml











Laboratory Tests








Test


 1/20/21


03:45 1/20/21


03:47


 


White Blood Count


 13.3 K/UL


(4.8-10.8)  H 





 


Red Blood Count


 3.63 M/UL


(4.20-5.40)  L 





 


Hemoglobin


 10.1 G/DL


(12.0-16.0)  L 





 


Hematocrit


 32.1 %


(37.0-47.0)  L 





 


Mean Corpuscular Volume 89 FL (80-99)   


 


Mean Corpuscular Hemoglobin


 27.9 PG


(27.0-31.0) 





 


Mean Corpuscular Hemoglobin


Concent 31.5 G/DL


(32.0-36.0)  L 





 


Red Cell Distribution Width


 15.6 %


(11.6-14.8)  H 





 


Platelet Count


 281 K/UL


(150-450) 





 


Mean Platelet Volume


 7.2 FL


(6.5-10.1) 





 


Neutrophils (%) (Auto)


 79.0 %


(45.0-75.0)  H 





 


Lymphocytes (%) (Auto)


 14.1 %


(20.0-45.0)  L 





 


Monocytes (%) (Auto)


 6.4 %


(1.0-10.0) 





 


Eosinophils (%) (Auto)


 0.1 %


(0.0-3.0) 





 


Basophils (%) (Auto)


 0.4 %


(0.0-2.0) 





 


Prothrombin Time


 12.0 SEC


(9.30-11.50)  H 





 


Prothromb Time International


Ratio 1.1 (0.9-1.1)  


 





 


Activated Partial


Thromboplast Time 28 SEC (23-33)


 





 


D-Dimer


 6.00 mg/L FEU


(0.00-0.49)  H 





 


Urine Color Yellow   


 


Urine Appearance


 Slightly


cloudy 





 


Urine pH 6.5 (4.5-8.0)   


 


Urine Specific Gravity


 1.015


(1.005-1.035) 





 


Urine Protein


 2+ (NEGATIVE)


H 





 


Urine Glucose (UA)


 Negative


(NEGATIVE) 





 


Urine Ketones


 Negative


(NEGATIVE) 





 


Urine Blood


 4+ (NEGATIVE)


H 





 


Urine Nitrite


 Negative


(NEGATIVE) 





 


Urine Bilirubin


 Negative


(NEGATIVE) 





 


Urine Urobilinogen


 4 MG/DL


(0.0-1.0)  H 





 


Urine Leukocyte Esterase


 1+ (NEGATIVE)


H 





 


Urine RBC


 20-30 /HPF (0


- 2)  H 





 


Urine WBC


 5-10 /HPF (0 -


2)  H 





 


Urine Squamous Epithelial


Cells Moderate /LPF


(NONE/OCC)  H 





 


Urine Bacteria


 Moderate /HPF


(NONE)  H 





 


Urine Yeast


 Many /HPF


(NONE)  H 





 


Sodium Level


 136 MMOL/L


(136-145) 





 


Potassium Level


 4.4 MMOL/L


(3.5-5.1) 





 


Chloride Level


 103 MMOL/L


() 





 


Carbon Dioxide Level


 27 MMOL/L


(21-32) 





 


Anion Gap


 6 mmol/L


(5-15) 





 


Blood Urea Nitrogen


 20 mg/dL


(7-18)  H 





 


Creatinine


 0.8 MG/DL


(0.55-1.30) 





 


Estimat Glomerular Filtration


Rate > 60 mL/min


(>60) 





 


Glucose Level


 154 MG/DL


()  H 





 


Lactic Acid Level


 1.40 mmol/L


(0.4-2.0) 





 


Calcium Level


 8.5 MG/DL


(8.5-10.1) 





 


Magnesium Level


 1.5 MG/DL


(1.8-2.4)  L 





 


Ferritin


 660 NG/ML


(8-388)  H 





 


Total Bilirubin


 0.9 MG/DL


(0.2-1.0) 





 


Aspartate Amino Transf


(AST/SGOT) 23 U/L (15-37)


 





 


Alanine Aminotransferase


(ALT/SGPT) 19 U/L (12-78)


 





 


Alkaline Phosphatase


 100 U/L


() 





 


Lactate Dehydrogenase


 309 U/L


()  H 





 


Total Creatine Kinase


 44 U/L


() 





 


Creatine Kinase MB


 2.1 NG/ML


(0.0-3.6) 





 


Creatine Kinase MB Relative


Index 4.7  


 





 


Troponin I


 0.059 ng/mL


(0.000-0.056) 





 


C-Reactive Protein,


Quantitative 14.0 mg/dL


(0.00-0.90)  H 





 


Pro-B-Type Natriuretic Peptide


 8374 pg/mL


(0-125)  H 





 


Total Protein


 6.4 G/DL


(6.4-8.2) 





 


Albumin


 1.7 G/DL


(3.4-5.0)  L 





 


Globulin 4.7 g/dL   


 


Albumin/Globulin Ratio


 0.4 (1.0-2.7)


L 





 


Lipase


 252 U/L


() 





 


Arterial Blood pH


 


 7.500


(7.350-7.450)


 


Arterial Blood Partial


Pressure CO2 


 33.9 mmHg


(35.0-45.0)  L


 


Arterial Blood Partial


Pressure O2 


 74.3 mmHg


(75.0-100.0)  L


 


Arterial Blood HCO3


 


 25.8 mmol/L


(22.0-26.0)


 


Arterial Blood Oxygen


Saturation 


 94.4 %


()  L


 


Arterial Blood Base Excess  2.8 (-2-2)  H


 


Steve Test  Positive  











Microbiology








 Date/Time


Source Procedure


Growth Status





 


 1/20/21 03:45


Nasal Nares - Final Complete


 


 1/20/21 03:45


Nasal Nares - Final Complete








Height (Feet):  5


Height (Inches):  7.00


Weight (Pounds):  205





Assessment/Plan


Problem List:  


(1) Hypoxia


Assessment & Plan:  leukocytosis 


anemia


covid


elevated d dimer


pulm consult


oxygen


steroids?


abx


ICD Codes:  R09.02 - Hypoxemia


SNOMED:  203578617


(2) Bilateral pneumonia


ICD Codes:  J18.9 - Pneumonia, unspecified organism


SNOMED:  084439416


(3) Coronavirus infection, unspecified


ICD Codes:  B34.2 - Coronavirus infection, unspecified


SNOMED:  237657361


(4) Decubitus skin ulcer


Assessment & Plan:  Patient identified admission of multiple decubitus skin 

ulcers and deep tissue injury.  Patient evaluated pictures taken care plan 

initiated nutritional optimization.  Turn every 2 hours hospital protocol 

initiated for decubitus prevention and care plan.  Apply with recommendations.  

Thank you for let me participate patient's care


ICD Codes:  L89.90 - Pressure ulcer of unspecified site, unspecified stage


SNOMED:  923086029











Huber Azar                Jan 20, 2021 12:43

## 2021-01-20 NOTE — EMERGENCY ROOM REPORT
History of Present Illness


General


Chief Complaint:  Dyspnea/Respdistress


Source:  Patient, Medical Record, EMS


 (Daniel Dan MD)





Present Illness


HPI


Patient is a 75-year-old female who presents for increased difficulty with 

breathing and decreased oxygen saturation.  Patient been sent in from [facility].  Had prior history of coronavirus infection with positive test 

10 days ago.  Patient had diminished oxygen saturation and had been started on 

nonrebreather by EMS.  Prior history of unstageable pressure sores, 

hypertension, dementia, psychosis and hyperlipidemia.  History is markedly 

limited by patient's mental status.


 (Daniel Dan MD)


Allergies:  


Coded Allergies:  


     No Known Allergies (Unverified , 1/20/21)





COVID-19 Screening


Contact w/high risk pt:  Yes


Experienced COVID-19 symptoms?:  Yes


COVID-19 Testing performed PTA:  Yes


COVID-19 Screening:  Positive COVID-19


COVID-19 Testing Source:  [facility]


 (Daniel Dan MD)





Patient History


Past Medical History:  see triage record


Reviewed Nursing Documentation:  PMH: Agreed; PSxH: Agreed (Daniel Dan MD)





Nursing Documentation-PMH


Hx Asthma:  Yes


History Of Psychiatric Problem:  Yes - schizophrenia


 (Daniel Dan MD)





Review of Systems


All Other Systems:  limited - Review of systems:  Review systems is limited by 

patient's being a poor historian


 (Daniel Dan MD)





Physical Exam





Vital Signs








  Date Time  Temp Pulse Resp B/P (MAP) Pulse Ox O2 Delivery O2 Flow Rate FiO2


 


1/20/21 03:23 99.7 130 28  90 Non-Rebreather  








General Appearance:  alert, obese, Chronically Ill


ENT:  dry mucus membranes


Neck:  limited range of motion


Respiratory:  respiratory distress, rhonchi, other - Tachypnea,


Cardiovascular #1:  tachycardia


Gastrointestinal:  normal inspection, non tender, soft


Genitourinary:  no CVA tenderness


Musculoskeletal:  other - Decreased range of motion


Neurologic:  alert, oriented x3


Psychiatric:  depressed affect


Skin:  other - Decubitus ulcer


 (Daniel Dan MD)





Procedures


Critical Care Time


Critical Care Time


Patient had a critical medical condition which untreated could potentially 

result in life or limb threatening injury.  Total  critical care time excluding 

procedures approximately 45 minutes.


 (Daniel Dan MD)





Medical Decision Making


Diagnostic Impression:  


   Primary Impression:  


   Coronavirus infection, unspecified


   Additional Impressions:  


   Hypoxia


   Bilateral pneumonia


ER Course


Patient presented for decreased saturation and respiratory distress.  

Differential diagnosis include was not limited to coronavirus pneumonia, CHF, 

pneumonia among others.  Because of complexity of patient's case laboratory 

tests and imaging studies were ordered.  Patient has prior history of 

coronavirus infection.  She was started immediately on high flow oxygen.  

Patient was given IV fluids as well as IV antibiotics empirically.  She was 

given Decadron due to hypoxemia.She was empirically started on IV antibiotics 

due to possible secondary infection with bacteria.  Patient be admitted to 

hospital for further management of pneumonia and hypoxemia.  Patient was started

on BiPAP.Patient was discussed with Dr. Bruno who agreed with admission.





Laboratory Tests








Test


 1/20/21


03:45 1/20/21


03:47 1/20/21


14:45 1/20/21


19:25


 


White Blood Count


 13.3 K/UL


(4.8-10.8)  H 


 


 





 


Red Blood Count


 3.63 M/UL


(4.20-5.40)  L 


 


 





 


Hemoglobin


 10.1 G/DL


(12.0-16.0)  L 


 


 





 


Hematocrit


 32.1 %


(37.0-47.0)  L 


 


 





 


Mean Corpuscular Volume 89 FL (80-99)     


 


Mean Corpuscular Hemoglobin


 27.9 PG


(27.0-31.0) 


 


 





 


Mean Corpuscular Hemoglobin


Concent 31.5 G/DL


(32.0-36.0)  L 


 


 





 


Red Cell Distribution Width


 15.6 %


(11.6-14.8)  H 


 


 





 


Platelet Count


 281 K/UL


(150-450) 


 


 





 


Mean Platelet Volume


 7.2 FL


(6.5-10.1) 


 


 





 


Neutrophils (%) (Auto)


 79.0 %


(45.0-75.0)  H 


 


 





 


Lymphocytes (%) (Auto)


 14.1 %


(20.0-45.0)  L 


 


 





 


Monocytes (%) (Auto)


 6.4 %


(1.0-10.0) 


 


 





 


Eosinophils (%) (Auto)


 0.1 %


(0.0-3.0) 


 


 





 


Basophils (%) (Auto)


 0.4 %


(0.0-2.0) 


 


 





 


Prothrombin Time


 12.0 SEC


(9.30-11.50)  H 


 


 





 


Prothrombin Time INR 1.1 (0.9-1.1)     


 


Activated Partial


Thromboplast Time 28 SEC (23-33)


 


 


 





 


D-Dimer


 6.00 mg/L FEU


(0.00-0.49)  H 


 


 





 


Urine Color Yellow     


 


Urine Appearance


 Slightly


cloudy 


 


 





 


Urine pH 6.5 (4.5-8.0)     


 


Urine Specific Gravity


 1.015


(1.005-1.035) 


 


 





 


Urine Protein


 2+ (NEGATIVE)


H 


 


 





 


Urine Glucose (UA)


 Negative


(NEGATIVE) 


 


 





 


Urine Ketones


 Negative


(NEGATIVE) 


 


 





 


Urine Blood


 4+ (NEGATIVE)


H 


 


 





 


Urine Nitrite


 Negative


(NEGATIVE) 


 


 





 


Urine Bilirubin


 Negative


(NEGATIVE) 


 


 





 


Urine Urobilinogen


 4 MG/DL


(0.0-1.0)  H 


 


 





 


Urine Leukocyte Esterase


 1+ (NEGATIVE)


H 


 


 





 


Urine RBC


 20-30 /HPF (0


- 2)  H 


 


 





 


Urine WBC


 5-10 /HPF (0 -


2)  H 


 


 





 


Urine Squamous Epithelial


Cells Moderate /LPF


(NONE/OCC)  H 


 


 





 


Urine Bacteria


 Moderate /HPF


(NONE)  H 


 


 





 


Urine Yeast


 Many /HPF


(NONE)  H 


 


 





 


Sodium Level


 136 MMOL/L


(136-145) 


 


 





 


Potassium Level


 4.4 MMOL/L


(3.5-5.1) 


 


 





 


Chloride Level


 103 MMOL/L


() 


 


 





 


Carbon Dioxide Level


 27 MMOL/L


(21-32) 


 


 





 


Anion Gap


 6 mmol/L


(5-15) 


 


 





 


Blood Urea Nitrogen


 20 mg/dL


(7-18)  H 


 


 





 


Creatinine


 0.8 MG/DL


(0.55-1.30) 


 


 





 


Estimated Glomerular


Filtration Rate > 60 mL/min


(>60) 


 


 





 


Glucose Level


 154 MG/DL


()  H 


 


 





 


Lactic Acid Level


 1.40 mmol/L


(0.4-2.0) 


 


 





 


Calcium Level


 8.5 MG/DL


(8.5-10.1) 


 


 





 


Magnesium Level


 1.5 MG/DL


(1.8-2.4)  L 


 1.9 MG/DL


(1.8-2.4) 





 


Ferritin


 660 NG/ML


(8-388)  H 


 


 





 


Total Bilirubin


 0.9 MG/DL


(0.2-1.0) 


 


 





 


Aspartate Amino Transferase


(AST) 23 U/L (15-37)


 


 


 





 


Alanine Aminotransferase (ALT)


 19 U/L (12-78)


 


 


 





 


Alkaline Phosphatase


 100 U/L


() 


 


 





 


Lactate Dehydrogenase


 309 U/L


()  H 


 


 





 


Total Creatine Kinase


 44 U/L


() 


 


 





 


Creatine Kinase MB


 2.1 NG/ML


(0.0-3.6) 


 


 





 


Creatine Kinase MB Relative


Index 4.7  


 


 


 





 


Troponin I


 0.059 ng/mL


(0.000-0.056) 


 


 





 


C-Reactive Protein,


Quantitative 14.0 mg/dL


(0.00-0.90)  H 


 


 





 


Pro-B-Type Natriuretic Peptide


 8374 pg/mL


(0-125)  H 


 


 





 


Total Protein


 6.4 G/DL


(6.4-8.2) 


 


 





 


Albumin


 1.7 G/DL


(3.4-5.0)  L 


 


 





 


Globulin 4.7 g/dL     


 


Albumin/Globulin Ratio


 0.4 (1.0-2.7)


L 


 


 





 


Lipase


 252 U/L


() 


 


 





 


Arterial Blood pH


 


 7.500


(7.350-7.450) 


 


 


Arterial Blood Partial


Pressure CO2 


 33.9 mmHg


(35.0-45.0)  L 


 


 


Arterial Blood Partial


Pressure O2 


 74.3 mmHg


(75.0-100.0)  L 


 


 


Arterial Blood HCO3


 


 25.8 mmol/L


(22.0-26.0) 


 


 


Arterial Blood Oxygen


Saturation 


 94.4 %


()  L 


 


 


Arterial Blood Base Excess  2.8 (-2-2)  H  


 


Steve Test  Positive    


 


Test


 1/21/21


04:05 


 


 





 


White Blood Count


 9.5 K/UL


(4.8-10.8) 


 


 





 


Red Blood Count


 2.62 M/UL


(4.20-5.40)  L 


 


 





 


Hemoglobin


 7.4 G/DL


(12.0-16.0)  L 


 


 





 


Hematocrit


 23.2 %


(37.0-47.0)  L 


 


 





 


Mean Corpuscular Volume 89 FL (80-99)     


 


Mean Corpuscular Hemoglobin


 28.3 PG


(27.0-31.0) 


 


 





 


Mean Corpuscular Hemoglobin


Concent 31.9 G/DL


(32.0-36.0)  L 


 


 





 


Red Cell Distribution Width


 16.3 %


(11.6-14.8)  H 


 


 





 


Platelet Count


 203 K/UL


(150-450) 


 


 





 


Mean Platelet Volume


 7.2 FL


(6.5-10.1) 


 


 





 


Neutrophils (%) (Auto)


 % (45.0-75.0)


 


 


 





 


Lymphocytes (%) (Auto)


 % (20.0-45.0)


 


 


 





 


Monocytes (%) (Auto)  % (1.0-10.0)     


 


Eosinophils (%) (Auto)  % (0.0-3.0)     


 


Basophils (%) (Auto)  % (0.0-2.0)     


 


Neutrophils % (Manual) Pending     


 


Lymphocytes % (Manual) Pending     


 


Platelet Estimate Pending     


 


Platelet Morphology Pending     


 


Phosphorus Level


 2.7 MG/DL


(2.5-4.9) 


 


 





 


Troponin I


 0.043 ng/mL


(0.000-0.056) 


 


 





 


Pro-B-Type Natriuretic Peptide


 6376 pg/mL


(0-125)  H 


 


 











Microbiology








 Date/Time


Source Procedure


Growth Status





 


 1/20/21 03:45


Nasal Nares - Final Complete


 


 1/20/21 03:45


Nasal Nares - Final Complete








 (Daniel Dan MD)


ER Course


I spoke with Dr. Cleary who is very familiar with the patient.  He told that the

patient is chronically ill and has been in and out of hospitals and skilled 

nursing facilities for quite some time.  He knows the patient to be DNR/DNI.  

However at this time we have been unable to find proper paperwork.  Attempts 

will be made to contact the family.  At this time patient will be treated as 

DNR/DNI.  Patient downgraded to stepdown unit.


 (Elías Stanley M.D.)


EKG Diagnostic Results


Rate:  tachycardiac


Rhythm:  NSR


ST Segments:  other - right bundle branch block


 (Daniel Dan MD)





Chest X-Ray Diagnostic Results


Chest X-Ray Diagnostic Results :  


   Chest X-Ray Ordered:  Yes


   # of Views/Limited/Complete:  1 View


   Indication:  Shortness of Breath


   EP Interpretation:  Yes


   Impression:  Other - bilateral infiltrate


 (Daniel Dan MD)





Last Vital Signs








  Date Time  Temp Pulse Resp B/P (MAP) Pulse Ox O2 Delivery O2 Flow Rate FiO2


 


1/20/21 03:23 99.7 130 28  90 Non-Rebreather  








Status:  improved


 (Daniel Dan MD)


Disposition:  ADMITTED AS INPATIENT


Condition:  Critical











Daniel Dan MD               Jan 20, 2021 03:42


Elías Stanley M.D.                Jan 20, 2021 10:44 [facility]  [address], [place], [place]    Authorization for Surgical Operation and Procedure                               I hereby authorize Corky Bustamante MD, my physician and his/her assistants (if applicable), which may include medical students, residents, and/or fellows, to perform the following surgical operation/ procedure and administer such anesthesia as may be determined necessary by my physician: Operation/Procedure name (s) CAPSULE ENDOSCOPY on Ollie Hernández   2.   I recognize that during the surgical operation/procedure, unforeseen conditions may necessitate additional or different procedures than those listed above.  I, therefore, further authorize and request that the above-named surgeon, assistants, or designees perform such procedures as are, in their judgment, necessary and desirable.    3.   My surgeon/physician has discussed prior to my surgery the potential benefits, risks and side effects of this procedure; the likelihood of achieving goals; and potential problems that might occur during recuperation.  They also discussed reasonable alternatives to the procedure, including risks, benefits, and side effects related to the alternatives and risks related to not receiving this procedure.  I have had all my questions answered and I acknowledge that no guarantee has been made as to the result that may be obtained.    4.   Should the need arise during my operation/procedure, which includes change of level of care prior to discharge, I also consent to the administration of blood and/or blood products.  Further, I understand that despite careful testing and screening of blood or blood products by collecting agencies, I may still be subject to ill effects as a result of receiving a blood transfusion and/or blood products.  The following are some, but not all, of the potential risks that can occur: fever and allergic reactions, hemolytic reactions, transmission of diseases such  as Hepatitis, AIDS and Cytomegalovirus (CMV) and fluid overload.  In the event that I wish to have an autologous transfusion of my own blood, or a directed donor transfusion, I will discuss this with my physician.  Check only if Refusing Blood or Blood Products  I understand refusal of blood or blood products as deemed necessary by my physician may have serious consequences to my condition to include possible death. I hereby assume responsibility for my refusal and release the hospital, its personnel, and my physicians from any responsibility for the consequences of my refusal.    o  Refuse   5.   I authorize the use of any specimen, organs, tissues, body parts or foreign objects that may be removed from my body during the operation/procedure for diagnosis, research or teaching purposes and their subsequent disposal by hospital authorities.  I also authorize the release of specimen test results and/or written reports to my treating physician on the hospital medical staff or other referring or consulting physicians involved in my care, at the discretion of the Pathologist or my treating physician.    6.   I consent to the photographing or videotaping of the operations or procedures to be performed, including appropriate portions of my body for medical, scientific, or educational purposes, provided my identity is not revealed by the pictures or by descriptive texts accompanying them.  If the procedure has been photographed/videotaped, the surgeon will obtain the original picture, image, videotape or CD.  The hospital will not be responsible for storage, release or maintenance of the picture, image, tape or CD.    7.   I consent to the presence of a  or observers in the operating room as deemed necessary by my physician or their designees.    8.   I recognize that in the event my procedure results in extended X-Ray/fluoroscopy time, I may develop a skin reaction.    9. If I have a Do Not Attempt  Resuscitation (DNAR) order in place, that status will be suspended while in the operating room, procedural suite, and during the recovery period unless otherwise explicitly stated by me (or a person authorized to consent on my behalf). The surgeon or my attending physician will determine when the applicable recovery period ends for purposes of reinstating the DNAR order.  10. Patients having a sterilization procedure: I understand that if the procedure is successful the results will be permanent and it will therefore be impossible for me to inseminate, conceive, or bear children.  I also understand that the procedure is intended to result in sterility, although the result has not been guaranteed.   11. I acknowledge that my physician has explained sedation/analgesia administration to me including the risk and benefits I consent to the administration of sedation/analgesia as may be necessary or desirable in the judgment of my physician.    I CERTIFY THAT I HAVE READ AND FULLY UNDERSTAND THE ABOVE CONSENT TO OPERATION and/or OTHER PROCEDURE.     ____________________________________  _________________________________        ______________________________  Signature of Patient    Signature of Responsible Person                Printed Name of Responsible Person                                      ____________________________________  _____________________________                ________________________________  Signature of Witness        Date  Time         Relationship to Patient    STATEMENT OF PHYSICIAN My signature below affirms that prior to the time of the procedure; I have explained to the patient and/or his/her legal representative, the risks and benefits involved in the proposed treatment and any reasonable alternative to the proposed treatment. I have also explained the risks and benefits involved in refusal of the proposed treatment and alternatives to the proposed treatment and have answered the patient's  questions. If I have a significant financial interest in a co-management agreement or a significant financial interest in any product or implant, or other significant relationship used in this procedure/surgery, I have disclosed this and had a discussion with my patient.     _____________________________________________________              _____________________________  (Signature of Physician)                                                                                         (Date)                                   (Time)  Patient Name: Ollie Hernández      : 1949      Printed: 2024     Medical Record #: [id number]                                      Page 1 of 1

## 2021-01-20 NOTE — NUR
ED Nurse Note:



Pt seen sleeping in bed. On BiPAP, tolerating well, not in any distress. Safety 
and comfort provided. Isolation precaution in place. Will cont to monitor.

## 2021-01-20 NOTE — EMERGENCY ROOM REPORT
Physical Exam





Vital Signs








  Date Time  Temp Pulse Resp B/P (MAP) Pulse Ox O2 Delivery O2 Flow Rate FiO2


 


1/20/21 03:23 99.7 130 28  90 Non-Rebreather  


 


1/20/21 03:54       15.0 


 


1/20/21 03:54    153/85    


 


1/20/21 04:00        100











Medical Decision Making


Diagnostic Impression:  


   Primary Impression:  


   COVID-19


   Additional Impressions:  


   Pneumonia


   Respiratory failure


ER Course


I was called by the inpatient nursing staff on this patient for a rapid 

response.  The patient needed an emergency intubation for respiratory failure.  

This is an inpatient and not a patient in the emergency department.  The patient

was admitted for COVID-19 pneumonia and is failing on BiPAP with CO2 retention 

and respiratory failure.





I perform rapid sequence intubation per direct laryngoscopy.  The patient was 

premedicated with etomidate and succinylcholine.  A 7.5 endotracheal tube was 

passed by direct visualization on the first attempt without desaturation or 

complication.  Color change was observed on the end-tidal CO2 after intubation. 

Breath sounds were equal bilaterally and chest x-ray showed appropriate 

endotracheal tube placement.  Further care is deferred to the inpatient 

physician caring for this patient as this was an emergency intubation on a 

patient that is not under my care.





Last Vital Signs








  Date Time  Temp Pulse Resp B/P (MAP) Pulse Ox O2 Delivery O2 Flow Rate FiO2


 


1/20/21 22:00  85 15 116/67 (83) 100   


 


1/20/21 20:00 98.3       


 


1/20/21 20:00      Mechanical Ventilator  


 


1/20/21 20:00        80


 


1/20/21 11:15       15.0 








Disposition:  ADMITTED AS INPATIENT


Condition:  Critical


Referrals:  


NON PHYSICIAN (PCP)











Concha Juárez DO            Jan 20, 2021 22:44

## 2021-01-20 NOTE — NUR
TX,FR,SDU S/P INTUBATION  DUE TORESP,FX PT WAS ON BIPAP STAT ABG DONE CXR,ABG WILL 
FOLLOWPOST INTUBATIO/

INTUBATED IN SDU BY ER GRETA AGEE DR /NOTIFIED PT, DTR,AND AWARE

## 2021-01-20 NOTE — DIAGNOSTIC IMAGING REPORT
EXAM:

  XR Chest, 1 View

 

CLINICAL HISTORY:

  S/P INTUB

 

TECHNIQUE:

  Frontal view of the chest.

 

COMPARISON:

  X-ray dated 1/20/2021

 

FINDINGS:

  Lungs:  Diffuse airspace opacities concerning for infectious process.

  Pleural space:  Small bilateral pleural effusions.

  Heart:  Unremarkable.

  Mediastinum:  Unremarkable.

  Bones/joints:  Unremarkable.

  Tubes, lines and devices:  Endotracheal tube within thoracic inlet.

 

IMPRESSION:     

1.  Endotracheal tube within thoracic inlet.

2.  Diffuse airspace opacities concerning for infectious process.

3.  Small bilateral pleural effusions.

## 2021-01-20 NOTE — NUR
NURSE NOTES:WOUND CARE NOTES:PT presented on admission with Multiple Pressure Injuries. 
Historical scar noted to R Hip. Full thickness Sacral Pressure Injury with undermined 
borders(L)16cm x (W)17.2cm x (D)3.8cm, Undermining clockwise 10-2 by 4.7cm @11o'clock; 
undermining clockwise 3-5o'clock by 3.3cm @5o'clock. Base of wound is 25% necrotic, 75% pink 
with  scattered slough. Bone exposure at base of wound.Slough noted along detached borders. 
An additional area of Necrosis noted along borders and periwound clockwise at  1-4o'clock. 
Small amt seropurulent exudate noted. Wound is malodorous. 

DTPI L Gluteal cheek(L)1.4cm x (W)0.3cm. Base of Pressure injury is purpuric with marginal 
erythema along borders.

Non-Blanchable erythema without induration/fluctuance noted to medial aspects of both R and 
L Knees.

DTPI R Heel that is 50% de-capped and is moist and pink, 50% purpuric cap. Periwound is 
fluctuant but blanchable.(L)5.7cm x (W)9.4cm

Reabsorbing DTPI R Lateral Malleolus(L)1cm x (W)1cm. Base of Pressure Injury is maroon, but 
dry.

DTPI L Heel(L)3.9cm x (W)9.6cm. Base of Pressure Injury is 40% Purpuric and indurated ,60% 
maroon.Surrounding heel is boggy ,non-blanchable with dry peeling skin.

Partial Thickness Pressure Injury medial L Malleolus(L)2.9cm x (W)2.2cm.  Base of wound is 
moist and viable with loose rolled skin flap along edges. No evidence of additional skin 
breakdown periwound.

DTPI L Hallux(L)0.4cm x (W)3.3cm. Base of injury is maroon and indurated with marginal 
erythema along borders.

DTPI dorso/medial L foot(L)0.5cm x (W)1.5cm.Base of injury is purpuric with marginal 
erythema along Borders.

DTPI Lateral L Foot (L)1.2cm x (W01.7cm. Base of Injury is maroon with fluctuance. 
surrounding non-blanchable erythema without induration.

Non-Blanchable erythema  with fluctuance distal/lateral L foot to L 5th metatarsal (L)3.5cm 
x (W)5.8cm.



Tx.Plan: Cleanse Sacral wound with Dakin's 0.125% Sol. Loosely pack with Dakin's moistened 
Kerlix. Apply Moisture Barrier

            Paste Periwound. Cover with Optifoam drsg Daily and prn.

            Apply Moisture Barrier Paste to DTPI  L Gluteal cheek. Cover with Optifoam drsg. 
Daily and prn.

            Apply Cavilon Skin BArrier to Medial R and L Knees. Cover each Knee with 
Optifoam drsgs. Change every 7 days 

            and prn.

            Apply Betadine to R Heel, R lateral Malleolus. Cover with Optifoam drsg. Change 
every 3 days and prn.

            Apply Betadine to L Heel, Medial L foot, L Hallux, Lateral L foot. Cover each 
site with Optifoam drsgs. Change 

            every 3 days and prn.

            Cover bony promineneces as needed with Optifoam drsgs.

            Reposition at least every 2hours or as tolerated.

            Place Pillow between Knees.

            Off-load heels with Pillow.

            APM/CECILLE Mattress overlay.

## 2021-01-20 NOTE — NUR
NURSE NOTES:

Received report from ARMAAN Sarah RN. Pt came to ED due to shortness of breath, and decrease 
O2 sat. Pt is A/O x 1-2. On Bipap tolerating the following setting 15/5, FiO2 100%, 
saturating %. Crawford catheter present on admission, IV site on left arm and right arm 
20G are intact and patent. Skin issues: pressure ulcer on sacrum st IV, and DTI on left and 
right heel are noted and dressing changed. Vital signs stable. Bed is locked and in lowest 
position, bed alarm on, call light is with the pt. Will continue to monitor pt. Will 
continue with the plan of care.

## 2021-01-20 NOTE — NUR
ED Nurse Note:Pt brought in by ambulance RA 58. Axox1 only to self. Pt arrived 
on 15L NRB SPO2 91%. Per EMS pt's SPO2 desaturated at the [facility]. When 
EMS checked her SPO2 it was 71% on room air. Pt is covid positve 10 days ago. 
PT is pale and edematous. Labs sent, urine sent, EKG done at bedside, RT at 
bedside, nasal swabs and rectal swab sent.

## 2021-01-20 NOTE — CONSULTATION
DATE OF CONSULTATION:  01/20/2021

NOTE:  INCOMPLETE DICTATION



INFECTIOUS DISEASE CONSULT



PRIMARY ATTENDING PHYSICIAN:  Shyla Bruno M.D.



REASON FOR CONSULT:  COVID-19 pneumonia.



HISTORY OF PRESENT ILLNESS:  This is a 75-year-old  female admitted

today from a skilled nursing facility because of shortness of breath.  She

was positive for COVID 10 days ago.  After admission, she first was on

oxygen by nonrebreather mask, then was put on BiPAP.  The patient is

noncommunicative and not a source of history.  Has leukocytosis of 13.3

and tachycardia of 130 at the time of admission.



PAST MEDICAL HISTORY:  Dementia, hypertension, dyslipidemia, anemia,

unstageable pressure ulcer.



ALLERGIES:  No known drug allergies.



MEDICATIONS:  Getting heparin, dexamethasone, ceftriaxone, azithromycin.

Got a dose of Zithromax in the ER.



SOCIAL HISTORY:  Nursing home resident.  No other history obtainable by the

patient.



PHYSICAL EXAMINATION:

VITAL SIGNS:  Temperature 98.3, pulse 75, blood pressure 143/76.

GENERAL APPEARANCE:  She seems to be well developed .

HEAD AND NECK:  Moist mucous membranes.

HEART:  Normal rate.

LUNGS:  On BiPAP, has bilateral rhonchi.

ABDOMEN:  Soft, nontender.

EXTREMITIES:  No edema.



LABORATORY AND DIAGNOSTIC DATA:  WBC 13.3, hemoglobin 10.9, hematocrit

32.1, platelets 281.  Sodium 136, potassium 4.4, chloride 103, bicarb 27,

BUN 20, creatinine 0.8.  Glucose is 154, ferritin 400.  Troponin elevated

at 0.059.  CRP elevated.  Albumin is 1.7.  Chest x-ray showed bilateral

infiltrates related to multifocal pneumonia.



IMPRESSION:  

1.Sepsis with tachycardia & leukocytosis

2. COVID19 pneumonia

3.Hypoxemia.

4. Dementia



Recommendation:

1. Continue Rocephin & Zithromax

2. Continue Dexamethasone









  ______________________________________________

  Jer Downs M.D.





DR:  OPAL

D:  01/20/2021 15:03

T:  01/20/2021 15:32

JOB#:  [id number]

CC:



MAGY

## 2021-01-21 VITALS — SYSTOLIC BLOOD PRESSURE: 114 MMHG | DIASTOLIC BLOOD PRESSURE: 63 MMHG

## 2021-01-21 VITALS — SYSTOLIC BLOOD PRESSURE: 116 MMHG | DIASTOLIC BLOOD PRESSURE: 64 MMHG

## 2021-01-21 VITALS — SYSTOLIC BLOOD PRESSURE: 99 MMHG | DIASTOLIC BLOOD PRESSURE: 55 MMHG

## 2021-01-21 VITALS — DIASTOLIC BLOOD PRESSURE: 55 MMHG | SYSTOLIC BLOOD PRESSURE: 109 MMHG

## 2021-01-21 VITALS — DIASTOLIC BLOOD PRESSURE: 72 MMHG | SYSTOLIC BLOOD PRESSURE: 114 MMHG

## 2021-01-21 VITALS — DIASTOLIC BLOOD PRESSURE: 59 MMHG | SYSTOLIC BLOOD PRESSURE: 116 MMHG

## 2021-01-21 VITALS — SYSTOLIC BLOOD PRESSURE: 138 MMHG | DIASTOLIC BLOOD PRESSURE: 67 MMHG

## 2021-01-21 VITALS — SYSTOLIC BLOOD PRESSURE: 127 MMHG | DIASTOLIC BLOOD PRESSURE: 74 MMHG

## 2021-01-21 VITALS — SYSTOLIC BLOOD PRESSURE: 110 MMHG | DIASTOLIC BLOOD PRESSURE: 58 MMHG

## 2021-01-21 VITALS — SYSTOLIC BLOOD PRESSURE: 113 MMHG | DIASTOLIC BLOOD PRESSURE: 61 MMHG

## 2021-01-21 VITALS — DIASTOLIC BLOOD PRESSURE: 78 MMHG | SYSTOLIC BLOOD PRESSURE: 132 MMHG

## 2021-01-21 VITALS — DIASTOLIC BLOOD PRESSURE: 75 MMHG | SYSTOLIC BLOOD PRESSURE: 115 MMHG

## 2021-01-21 VITALS — SYSTOLIC BLOOD PRESSURE: 110 MMHG | DIASTOLIC BLOOD PRESSURE: 56 MMHG

## 2021-01-21 VITALS — SYSTOLIC BLOOD PRESSURE: 101 MMHG | DIASTOLIC BLOOD PRESSURE: 49 MMHG

## 2021-01-21 VITALS — SYSTOLIC BLOOD PRESSURE: 138 MMHG | DIASTOLIC BLOOD PRESSURE: 73 MMHG

## 2021-01-21 VITALS — SYSTOLIC BLOOD PRESSURE: 117 MMHG | DIASTOLIC BLOOD PRESSURE: 57 MMHG

## 2021-01-21 VITALS — DIASTOLIC BLOOD PRESSURE: 74 MMHG | SYSTOLIC BLOOD PRESSURE: 118 MMHG

## 2021-01-21 VITALS — DIASTOLIC BLOOD PRESSURE: 65 MMHG | SYSTOLIC BLOOD PRESSURE: 116 MMHG

## 2021-01-21 VITALS — SYSTOLIC BLOOD PRESSURE: 106 MMHG | DIASTOLIC BLOOD PRESSURE: 69 MMHG

## 2021-01-21 VITALS — SYSTOLIC BLOOD PRESSURE: 119 MMHG | DIASTOLIC BLOOD PRESSURE: 75 MMHG

## 2021-01-21 VITALS — DIASTOLIC BLOOD PRESSURE: 75 MMHG | SYSTOLIC BLOOD PRESSURE: 119 MMHG

## 2021-01-21 VITALS — DIASTOLIC BLOOD PRESSURE: 65 MMHG | SYSTOLIC BLOOD PRESSURE: 125 MMHG

## 2021-01-21 VITALS — SYSTOLIC BLOOD PRESSURE: 109 MMHG | DIASTOLIC BLOOD PRESSURE: 58 MMHG

## 2021-01-21 VITALS — SYSTOLIC BLOOD PRESSURE: 137 MMHG | DIASTOLIC BLOOD PRESSURE: 71 MMHG

## 2021-01-21 LAB
% IRON SATURATION: 18 % (ref 15–50)
ADD MANUAL DIFF: YES
ANION GAP SERPL CALC-SCNC: 7 MMOL/L (ref 5–15)
BUN SERPL-MCNC: 22 MG/DL (ref 7–18)
CALCIUM SERPL-MCNC: 7.9 MG/DL (ref 8.5–10.1)
CHLORIDE SERPL-SCNC: 104 MMOL/L (ref 98–107)
CO2 SERPL-SCNC: 25 MMOL/L (ref 21–32)
CREAT SERPL-MCNC: 0.7 MG/DL (ref 0.55–1.3)
ERYTHROCYTE [DISTWIDTH] IN BLOOD BY AUTOMATED COUNT: 16.3 % (ref 11.6–14.8)
FERRITIN SERPL-MCNC: 852 NG/ML (ref 8–388)
HCT VFR BLD CALC: 23.2 % (ref 37–47)
HGB BLD-MCNC: 7.4 G/DL (ref 12–16)
IRON SERPL-MCNC: 23 UG/DL (ref 50–175)
MCV RBC AUTO: 89 FL (ref 80–99)
PHOSPHATE SERPL-MCNC: 2.7 MG/DL (ref 2.5–4.9)
PLATELET # BLD: 203 K/UL (ref 150–450)
POTASSIUM SERPL-SCNC: 3.8 MMOL/L (ref 3.5–5.1)
RBC # BLD AUTO: 2.62 M/UL (ref 4.2–5.4)
SODIUM SERPL-SCNC: 136 MMOL/L (ref 136–145)
TIBC SERPL-MCNC: 131 UG/DL (ref 250–450)
UNSATURATED IRON BINDING: 108 UG/DL (ref 112–346)
WBC # BLD AUTO: 9.5 K/UL (ref 4.8–10.8)

## 2021-01-21 RX ADMIN — DEXTROSE AND SODIUM CHLORIDE SCH MLS/HR: 5; .45 INJECTION, SOLUTION INTRAVENOUS at 02:00

## 2021-01-21 NOTE — DIAGNOSTIC IMAGING REPORT
EXAM:

  XR Abdomen, 2 Views

 

CLINICAL HISTORY:

  NGT

 

TECHNIQUE:

  Frontal view of the abdomen/pelvis with upright view of the abdomen.

 

COMPARISON:

Chest x-ray 1/20/2021.

 

FINDINGS:

  Intraperitoneal space:  No free air.

  Gastrointestinal tract:  Unremarkable.  No dilation.

  Bones/joints:  Unremarkable.

  Tubes, lines and devices:  An enteric tube courses below the level of 

the diaphragm with both the terminus and side port overlying the expected 

region of the stomach.

 

Other: Patchy opacities within the lung bases, seen to better advantage 

on chest x-ray performed same day and dictated separately.

 

IMPRESSION:     

1.  No acute findings in the abdomen or pelvis.

2.  An enteric tube is visualized, likely terminating within the stomach.

## 2021-01-21 NOTE — NUR
RD ASSESSMENT & RECOMMENDATIONS

SEE CARE ACTIVITY FOR COMPLETE ASSESSMENT



DAILY ESTIMATED NEEDS:

Needs based on Critical care, wound 56.6kg abw  

25-30  kcals/kg 

[id number]  total kcals

1.25-2  g protein/kg

  g total protein 

25-30  mL/kg

[id number]  total fluid mLs



NUTRITION DIAGNOSIS:

Increased kcal and pro needs r/t wound care as evidenced by multiples

areas of skin breakdown, including full thickness sacral wound.



CURRENT TF: NPO  





ENTERAL NUTRITION RECOMMENDATIONS:

As able, Glucerna 1.5 goal of 45ml/hr x24 hrs   

to provide 1080ml, 1620 kcal, 89g pro, 820ml free H2O  



- NGT noted, rec non oral feeds if hemodynamically stable.

- Start @15ml/hr for 6 hrs, advance as tolerated 10ml/hr q4-6 hrs to goal.

- Flush per MD. HOB over 30 degrees

- Will provide 100% est kcal/pro needs when at goal.





ADDITIONAL RECOMMENDATIONS:

1) Feed with hemodynamic stability- TF recs as above  

2) Obtain a calibrated bed scale wt  

3) Niss / accuchecks for BG control  

4) Monitor lytes, replete as needed  

5) With TF at goal add ROBIN BID  

    -> WC add Vit C 500mg BID, Zn SO4 220mg qdaily x10 days

## 2021-01-21 NOTE — SURGERY PROGRESS NOTE
Surgery Progress Note


Subjective


Additional Comments


ill appearing in icu


lab snoted


dressings changed





Objective





Last 24 Hour Vital Signs








  Date Time  Temp Pulse Resp B/P (MAP) Pulse Ox O2 Delivery O2 Flow Rate FiO2


 


1/21/21 16:00      Mechanical Ventilator  


 


1/21/21 16:00        40


 


1/21/21 15:08  144 28     40


 


1/21/21 15:00  106 17 137/71 (93) 97   


 


1/21/21 14:00  86 17 118/74 (89) 97   


 


1/21/21 13:00  94 18 132/78 (96) 97   


 


1/21/21 12:00      Mechanical Ventilator  


 


1/21/21 12:00        50


 


1/21/21 12:00  81      


 


1/21/21 12:00  89 17 114/72 (86) 96   


 


1/21/21 11:28  80 24     40


 


1/21/21 11:00  97 21 116/65 (82) 97   


 


1/21/21 10:00  87 16 125/65 (85) 98   


 


1/21/21 09:00 98.0 101 22 106/69 (81) 97   


 


1/21/21 08:00  103 22 114/63 (80) 97   


 


1/21/21 08:00  103      


 


1/21/21 08:00        50


 


1/21/21 08:00      Mechanical Ventilator  


 


1/21/21 07:19  93 22     40


 


1/21/21 07:00  106 15 138/67 (90) 100   


 


1/21/21 06:00  95 18 110/58 (75) 96   


 


1/21/21 05:00  89 15 110/56 (74) 98   


 


1/21/21 04:00      Mechanical Ventilator  


 


1/21/21 04:00  95      


 


1/21/21 04:00        50


 


1/21/21 04:00 98.4 93 15 99/55 (70) 99   


 


1/21/21 03:56  100 22     50


 


1/21/21 03:00  89 16 116/64 (81) 100   


 


1/21/21 02:00  91 17 109/58 (75) 100   


 


1/21/21 01:00  89 15 101/49 (66) 99   


 


1/21/21 00:00  123      


 


1/21/21 00:00 98.7 101 22 117/57 (77) 97   


 


1/21/21 00:00      Mechanical Ventilator  


 


1/20/21 23:19  94 20     60


 


1/20/21 23:00  102 19 140/74 (96) 100   


 


1/20/21 22:00  85 15 116/67 (83) 100   


 


1/20/21 21:00  99 19 119/57 (77) 100   


 


1/20/21 20:00 98.3 96 16 110/65 (80) 100   


 


1/20/21 20:00      Mechanical Ventilator  


 


1/20/21 20:00        80


 


1/20/21 19:22  129 38  80   


 


1/20/21 19:19  96 18     80


 


1/20/21 19:00  80 14 110/57 (74) 100   








I&O











Intake and Output  


 


 1/20/21 1/21/21





 19:00 07:00


 


Intake Total 1180 ml 825 ml


 


Output Total 100 ml 370 ml


 


Balance 1080 ml 455 ml


 


  


 


Intake IV Total 1180 ml 825 ml


 


Output Urine Total 100 ml 370 ml








Dressing:  saturated


Cardiovascular:  RSR


Respiratory:  decreased breath sounds


Abdomen:  non-tender, present bowel sounds


Extremities:  no tenderness, no cyanosis





Laboratory Tests








Test


 1/20/21


19:25 1/21/21


04:05 1/21/21


13:22


 


Arterial Blood pH


 7.479


(7.350-7.450) 


 7.494


(7.350-7.450)


 


Arterial Blood Partial


Pressure CO2 28.3 mmHg


(35.0-45.0)  L 


 32.0 mmHg


(35.0-45.0)  L


 


Arterial Blood Partial


Pressure O2 163.2 mmHg


(75.0-100.0)  H 


 78.0 mmHg


(75.0-100.0)


 


Arterial Blood HCO3


 20.6 mmol/L


(22.0-26.0)  L 


 24.1 mmol/L


(22.0-26.0)


 


Arterial Blood Oxygen


Saturation 98.3 %


() 


 95.0 %


()


 


Arterial Blood Base Excess -2.3 (-2-2)  L  1.0 (-2-2)  


 


Steve Test Positive    Positive  


 


White Blood Count


 


 9.5 K/UL


(4.8-10.8) 





 


Red Blood Count


 


 2.62 M/UL


(4.20-5.40)  L 





 


Hemoglobin


 


 7.4 G/DL


(12.0-16.0)  L 





 


Hematocrit


 


 23.2 %


(37.0-47.0)  L 





 


Mean Corpuscular Volume  89 FL (80-99)   


 


Mean Corpuscular Hemoglobin


 


 28.3 PG


(27.0-31.0) 





 


Mean Corpuscular Hemoglobin


Concent 


 31.9 G/DL


(32.0-36.0)  L 





 


Red Cell Distribution Width


 


 16.3 %


(11.6-14.8)  H 





 


Platelet Count


 


 203 K/UL


(150-450) 





 


Mean Platelet Volume


 


 7.2 FL


(6.5-10.1) 





 


Neutrophils (%) (Auto)


 


 % (45.0-75.0)


 





 


Lymphocytes (%) (Auto)


 


 % (20.0-45.0)


 





 


Monocytes (%) (Auto)   % (1.0-10.0)   


 


Eosinophils (%) (Auto)   % (0.0-3.0)   


 


Basophils (%) (Auto)   % (0.0-2.0)   


 


Differential Total Cells


Counted 


 100  


 





 


Neutrophils % (Manual)  84 % (45-75)  H 


 


Lymphocytes % (Manual)  12 % (20-45)  L 


 


Monocytes % (Manual)  4 % (1-10)   


 


Eosinophils % (Manual)  0 % (0-3)   


 


Basophils % (Manual)  0 % (0-2)   


 


Band Neutrophils  0 % (0-8)   


 


Platelet Estimate  Adequate   


 


Platelet Morphology  Normal   


 


Hypochromasia  2+   


 


Anisocytosis  2+   


 


Sodium Level


 


 136 MMOL/L


(136-145) 





 


Potassium Level


 


 3.8 MMOL/L


(3.5-5.1) 





 


Chloride Level


 


 104 MMOL/L


() 





 


Carbon Dioxide Level


 


 25 MMOL/L


(21-32) 





 


Anion Gap


 


 7 mmol/L


(5-15) 





 


Blood Urea Nitrogen


 


 22 mg/dL


(7-18)  H 





 


Creatinine


 


 0.7 MG/DL


(0.55-1.30) 





 


Estimat Glomerular Filtration


Rate 


 > 60 mL/min


(>60) 





 


Glucose Level


 


 165 MG/DL


()  H 





 


Calcium Level


 


 7.9 MG/DL


(8.5-10.1)  L 





 


Phosphorus Level


 


 2.7 MG/DL


(2.5-4.9) 





 


Magnesium Level


 


 1.8 MG/DL


(1.8-2.4) 





 


Iron Level


 


 23 ug/dL


()  L 





 


Total Iron Binding Capacity


 


 131 ug/dL


(250-450)  L 





 


Percent Iron Saturation  18 % (15-50)   


 


Unsaturated Iron Binding


 


 108 ug/dL


(112-346)  L 





 


Ferritin


 


 852 NG/ML


(8-388)  H 





 


Troponin I


 


 0.043 ng/mL


(0.000-0.056) 





 


Pro-B-Type Natriuretic Peptide


 


 6376 pg/mL


(0-125)  H 














Plan


Problems:  


(1) Hypoxia


Assessment & Plan:  leukocytosis 


anemia


covid


elevated d dimer


pulm consult


oxygen


steroids?


abx





(2) Bilateral pneumonia


(3) Coronavirus infection, unspecified


(4) Decubitus skin ulcer


Assessment & Plan:  Patient identified admission of multiple decubitus skin 

ulcers and deep tissue injury.  Patient evaluated pictures taken care plan 

initiated nutritional optimization.  Turn every 2 hours hospital protocol 

initiated for decubitus prevention and care plan.  Apply with recommendations.  

Thank you for let me participate patient's care














Huber Azar                Jan 21, 2021 18:15

## 2021-01-21 NOTE — PULMONOLOGY PROGRESS NOTE
Subjective


ROS Limited/Unobtainable:  Yes


Interval Events:  Intubated


Constitutional:  Reports: no symptoms, other - transferred to ICU


HEENT:  Repors: no symptoms


Respiratory:  Reports: no symptoms


Cardiovascular:  Reports: no symptoms


Gastrointestinal/Abdominal:  Reports: no symptoms


Genitourinary:  Reports: no symptoms


Allergies:  


Coded Allergies:  


     No Known Allergies (Unverified , 1/20/21)





Objective





Last 24 Hour Vital Signs








  Date Time  Temp Pulse Resp B/P (MAP) Pulse Ox O2 Delivery O2 Flow Rate FiO2


 


1/21/21 07:19  93 22     40


 


1/21/21 07:00  106 15 138/67 (90) 100   


 


1/21/21 06:00  95 18 110/58 (75) 96   


 


1/21/21 05:00  89 15 110/56 (74) 98   


 


1/21/21 04:00      Mechanical Ventilator  


 


1/21/21 04:00  95      


 


1/21/21 04:00        50


 


1/21/21 04:00 98.4 93 15 99/55 (70) 99   


 


1/21/21 03:56  100 22     50


 


1/21/21 03:00  89 16 116/64 (81) 100   


 


1/21/21 02:00  91 17 109/58 (75) 100   


 


1/21/21 01:00  89 15 101/49 (66) 99   


 


1/21/21 00:00  123      


 


1/21/21 00:00 98.7 101 22 117/57 (77) 97   


 


1/21/21 00:00      Mechanical Ventilator  


 


1/20/21 23:19  94 20     60


 


1/20/21 23:00  102 19 140/74 (96) 100   


 


1/20/21 22:00  85 15 116/67 (83) 100   


 


1/20/21 21:00  99 19 119/57 (77) 100   


 


1/20/21 20:00 98.3 96 16 110/65 (80) 100   


 


1/20/21 20:00      Mechanical Ventilator  


 


1/20/21 20:00        80


 


1/20/21 19:22  129 38  80   


 


1/20/21 19:19  96 18     80


 


1/20/21 19:00  80 14 110/57 (74) 100   


 


1/20/21 18:00 99.4 76 17 102/64 (77) 100   


 


1/20/21 17:53        100


 


1/20/21 17:30  75      


 


1/20/21 17:02  87 16  99 Mechanical Ventilator  100


 


1/20/21 17:02  87 16     100


 


1/20/21 16:16  120      


 


1/20/21 16:00 98.7 89 17 129/72 (91) 98   


 


1/20/21 16:00      Bi-pap  


 


1/20/21 15:33  111      


 


1/20/21 15:21  117 20  96   100


 


1/20/21 12:23      Bi-pap  


 


1/20/21 11:58  75      

















Intake and Output  


 


 1/20/21 1/21/21





 19:00 07:00


 


Intake Total 1180 ml 825 ml


 


Output Total 100 ml 370 ml


 


Balance 1080 ml 455 ml


 


  


 


Intake IV Total 1180 ml 825 ml


 


Output Urine Total 100 ml 370 ml








General Appearance:  no acute distress


HEENT:  normocephalic


Respiratory:  chest wall non-tender


Cardiovascular:  normal peripheral pulses, normal rate


Abdomen:  normal bowel sounds


Extremities:  no cyanosis





Microbiology








 Date/Time


Source Procedure


Growth Status





 


 1/20/21 03:45


Nasal Nares - Final Complete


 


 1/20/21 03:45


Nasal Nares - Final Complete








Laboratory Tests


1/20/21 14:45: Magnesium Level 1.9


1/20/21 19:25: 


Arterial Blood pH 7.479H, Arterial Blood Partial Pressure CO2 28.3L, Arterial 

Blood Partial Pressure O2 163.2H, Arterial Blood HCO3 20.6L, Arterial Blood 

Oxygen Saturation 98.3, Arterial Blood Base Excess -2.3L, Steve Test Positive


1/21/21 04:05: 


Magnesium Level 1.8, White Blood Count 9.5, Red Blood Count 2.62L, Hemoglobin 

7.4L, Hematocrit 23.2L, Mean Corpuscular Volume 89, Mean Corpuscular Hemoglobin 

28.3, Mean Corpuscular Hemoglobin Concent 31.9L, Red Cell Distribution Width 

16.3H, Platelet Count 203, Mean Platelet Volume 7.2, Neutrophils (%) (Auto) , 

Lymphocytes (%) (Auto) , Monocytes (%) (Auto) , Eosinophils (%) (Auto) , 

Basophils (%) (Auto) , Differential Total Cells Counted 100, Neutrophils % 

(Manual) 84H, Lymphocytes % (Manual) 12L, Monocytes % (Manual) 4, Eosinophils % 

(Manual) 0, Basophils % (Manual) 0, Band Neutrophils 0, Platelet Estimate 

Adequate, Platelet Morphology Normal, Hypochromasia 2+, Anisocytosis 2+, Sodium 

Level 136, Potassium Level 3.8, Chloride Level 104, Carbon Dioxide Level 25, 

Anion Gap 7, Blood Urea Nitrogen 22H, Creatinine 0.7, Estimat Glomerular 

Filtration Rate > 60, Glucose Level 165H, Calcium Level 7.9L, Phosphorus Level 

2.7, Iron Level 23L, Total Iron Binding Capacity 131L, Percent Iron Saturation 

18, Unsaturated Iron Binding 108L, Ferritin 852H, Troponin I 0.043, Pro-B-Type 

Natriuretic Peptide 6376H





Current Medications








 Medications


  (Trade)  Dose


 Ordered  Sig/Kvng


 Route


 PRN Reason  Start Time


 Stop Time Status Last Admin


Dose Admin


 


 Acetaminophen


  (Tylenol)  650 mg  Q4H  PRN


 ORAL


 FEVER  1/20/21 13:30


 2/19/21 13:29   





 


 Acetaminophen


  (Tylenol)  650 mg  Q4H  PRN


 ORAL


 Mild Pain (Pain Scale 1-3)  1/20/21 13:30


 2/19/21 13:29   





 


 Aspirin


  (Ecotrin)  81 mg  DAILY


 ORAL


   1/21/21 09:00


 3/7/21 08:59   





 


 Atorvastatin


 Calcium


  (Lipitor)  20 mg  BEDTIME


 ORAL


   1/20/21 21:00


 4/20/21 20:59   





 


 Azithromycin 500


 mg/Dextrose  275 ml @ 


 275 mls/hr  Q24HRS


 IV


   1/20/21 15:00


 1/26/21 15:59  1/20/21 15:52





 


 Ceftriaxone


 Sodium 1 gm/


 Dextrose  55 ml @ 


 110 mls/hr  Q24H


 IVPB


   1/20/21 14:00


 1/27/21 13:59  1/20/21 17:00





 


 Dexamethasone


 Sodium Phosphate


  (Decadron 10mg/


 ml Inj)  6 mg  DAILY


 IV


   1/21/21 09:00


 1/30/21 08:59  1/21/21 08:10





 


 Dextrose/Sodium


 Chloride  1,000 ml @ 


 75 mls/hr  [id number]


 IV


   1/20/21 13:30


 2/19/21 13:29  1/21/21 02:00





 


 Heparin Sodium


  (Porcine)


  (Heparin 5000


 units/ml)  5,000 units  EVERY 12  HOURS


 SUBQ


   1/21/21 09:00


 3/7/21 08:59   





 


 Sodium


 Hypochlorite


  (Dakin's Half


 Strength)  1 applic  DAILY


 TOPIC


   1/21/21 10:15


 2/20/21 10:14  1/21/21 10:54














Assessment/Plan


Assessment/Plan


1. COVID-19 pneumonia with hypoxia


   -Continue broad-spectrum antibiotics for pneumonia coverage


   -Continue with dexamethasone given hypoxia


      


2. Elevated inflammatory markers


   Lovenox for DVT prophylaxis


3. Respiratory Failure


   -Now intubated


            -Continue AC mode; FiO2 40%; PEEP 5; rate 16; Vt 400; check ABg


Leukocytosis


Anemia


Hyperglycemia


Troponin leak


Elevated BNP


OTTO











Albino Ventura MD           Jan 21, 2021 11:36

## 2021-01-21 NOTE — INFECTIOUS DISEASES PROG NOTE
Assessment/Plan


Assessment/Plan





IMPRESSION:  


1.Sepsis with tachycardia & leukocytosis


2. COVID19 pneumonia


3.Hypoxemia.


4. Dementia


5. RBBB





Recommendation:


1. Continue Rocephin & Zithromax


2. Continue Dexamethasone


3. Case was D/W primary MD


4. Not eligible for Remdesivir





Subjective


ROS Limited/Unobtainable:  Yes


Constitutional:  Reports: other - transferred to ICU


Respiratory:  Reports: other - intubated last night


Allergies:  


Coded Allergies:  


     No Known Allergies (Unverified , 1/20/21)





Objective





Last 24 Hour Vital Signs








  Date Time  Temp Pulse Resp B/P (MAP) Pulse Ox O2 Delivery O2 Flow Rate FiO2


 


1/21/21 07:00  106 15 138/67 (90) 100   


 


1/21/21 06:00  95 18 110/58 (75) 96   


 


1/21/21 05:00  89 15 110/56 (74) 98   


 


1/21/21 04:00      Mechanical Ventilator  


 


1/21/21 04:00  95      


 


1/21/21 04:00        50


 


1/21/21 04:00 98.4 93 15 99/55 (70) 99   


 


1/21/21 03:56  100 22     50


 


1/21/21 03:00  89 16 116/64 (81) 100   


 


1/21/21 02:00  91 17 109/58 (75) 100   


 


1/21/21 01:00  89 15 101/49 (66) 99   


 


1/21/21 00:00  123      


 


1/21/21 00:00 98.7 101 22 117/57 (77) 97   


 


1/21/21 00:00      Mechanical Ventilator  


 


1/20/21 23:19  94 20     60


 


1/20/21 23:00  102 19 140/74 (96) 100   


 


1/20/21 22:00  85 15 116/67 (83) 100   


 


1/20/21 21:00  99 19 119/57 (77) 100   


 


1/20/21 20:00 98.3 96 16 110/65 (80) 100   


 


1/20/21 20:00      Mechanical Ventilator  


 


1/20/21 20:00        80


 


1/20/21 19:22  129 38  80   


 


1/20/21 19:19  96 18     80


 


1/20/21 19:00  80 14 110/57 (74) 100   


 


1/20/21 18:00 99.4 76 17 102/64 (77) 100   


 


1/20/21 17:53        100


 


1/20/21 17:30  75      


 


1/20/21 17:02  87 16  99 Mechanical Ventilator  100


 


1/20/21 17:02  87 16     100


 


1/20/21 16:16  120      


 


1/20/21 16:00 98.7 89 17 129/72 (91) 98   


 


1/20/21 16:00      Bi-pap  


 


1/20/21 15:33  111      


 


1/20/21 15:21  117 20  96   100


 


1/20/21 12:23      Bi-pap  


 


1/20/21 11:58  75      


 


1/20/21 11:20  67 17  97 Bi-Pap  100


 


1/20/21 11:20  67 17  97   100


 


1/20/21 11:15 98.3 84 23 143/76 100 Bi-pap 15.0 100


 


1/20/21 11:02 98.3 84 23 143/76 100 Bi-pap 15.0 100








Height (Feet):  5


Height (Inches):  7.00


Weight (Pounds):  173


HEENT:  mucous membranes moist, other - orally intubated


Respiratory/Chest:  other - on Ventilator, FIO2=50%


Cardiovascular:  tachycardia


Abdomen:  soft, non tender, other - NG tube


Extremities:  no edema


Neurologic/Psychiatric:  other - opens eyes





Microbiology








 Date/Time


Source Procedure


Growth Status





 


 1/20/21 03:45


Nasal Nares - Final Complete


 


 1/20/21 03:45


Nasal Nares - Final Complete











Laboratory Tests








Test


 1/20/21


14:45 1/20/21


19:25 1/21/21


04:05


 


Magnesium Level


 1.9 MG/DL


(1.8-2.4) 


 1.8 MG/DL


(1.8-2.4)


 


Arterial Blood pH


 


 7.479


(7.350-7.450) 





 


Arterial Blood Partial


Pressure CO2 


 28.3 mmHg


(35.0-45.0)  L 





 


Arterial Blood Partial


Pressure O2 


 163.2 mmHg


(75.0-100.0)  H 





 


Arterial Blood HCO3


 


 20.6 mmol/L


(22.0-26.0)  L 





 


Arterial Blood Oxygen


Saturation 


 98.3 %


() 





 


Arterial Blood Base Excess  -2.3 (-2-2)  L 


 


Steve Test  Positive   


 


White Blood Count


 


 


 9.5 K/UL


(4.8-10.8)


 


Red Blood Count


 


 


 2.62 M/UL


(4.20-5.40)  L


 


Hemoglobin


 


 


 7.4 G/DL


(12.0-16.0)  L


 


Hematocrit


 


 


 23.2 %


(37.0-47.0)  L


 


Mean Corpuscular Volume   89 FL (80-99)  


 


Mean Corpuscular Hemoglobin


 


 


 28.3 PG


(27.0-31.0)


 


Mean Corpuscular Hemoglobin


Concent 


 


 31.9 G/DL


(32.0-36.0)  L


 


Red Cell Distribution Width


 


 


 16.3 %


(11.6-14.8)  H


 


Platelet Count


 


 


 203 K/UL


(150-450)


 


Mean Platelet Volume


 


 


 7.2 FL


(6.5-10.1)


 


Neutrophils (%) (Auto)


 


 


 % (45.0-75.0)





 


Lymphocytes (%) (Auto)


 


 


 % (20.0-45.0)





 


Monocytes (%) (Auto)    % (1.0-10.0)  


 


Eosinophils (%) (Auto)    % (0.0-3.0)  


 


Basophils (%) (Auto)    % (0.0-2.0)  


 


Neutrophils % (Manual)   Pending  


 


Lymphocytes % (Manual)   Pending  


 


Platelet Estimate   Pending  


 


Platelet Morphology   Pending  


 


Sodium Level


 


 


 136 MMOL/L


(136-145)


 


Potassium Level


 


 


 3.8 MMOL/L


(3.5-5.1)


 


Chloride Level


 


 


 104 MMOL/L


()


 


Carbon Dioxide Level


 


 


 25 MMOL/L


(21-32)


 


Anion Gap


 


 


 7 mmol/L


(5-15)


 


Blood Urea Nitrogen


 


 


 22 mg/dL


(7-18)  H


 


Creatinine


 


 


 0.7 MG/DL


(0.55-1.30)


 


Estimat Glomerular Filtration


Rate 


 


 > 60 mL/min


(>60)


 


Glucose Level


 


 


 165 MG/DL


()  H


 


Calcium Level


 


 


 7.9 MG/DL


(8.5-10.1)  L


 


Phosphorus Level


 


 


 2.7 MG/DL


(2.5-4.9)


 


Iron Level


 


 


 23 ug/dL


()  L


 


Total Iron Binding Capacity


 


 


 131 ug/dL


(250-450)  L


 


Percent Iron Saturation   18 % (15-50)  


 


Unsaturated Iron Binding


 


 


 108 ug/dL


(112-346)  L


 


Ferritin


 


 


 852 NG/ML


(8-388)  H


 


Troponin I


 


 


 0.043 ng/mL


(0.000-0.056)


 


Pro-B-Type Natriuretic Peptide


 


 


 6376 pg/mL


(0-125)  H











Current Medications








 Medications


  (Trade)  Dose


 Ordered  Sig/Kvng


 Route


 PRN Reason  Start Time


 Stop Time Status Last Admin


Dose Admin


 


 Acetaminophen


  (Tylenol)  650 mg  Q4H  PRN


 ORAL


 FEVER  1/20/21 13:30


 2/19/21 13:29   





 


 Acetaminophen


  (Tylenol)  650 mg  Q4H  PRN


 ORAL


 Mild Pain (Pain Scale 1-3)  1/20/21 13:30


 2/19/21 13:29   





 


 Aspirin


  (Ecotrin)  81 mg  DAILY


 ORAL


   1/21/21 09:00


 3/7/21 08:59   





 


 Atorvastatin


 Calcium


  (Lipitor)  20 mg  BEDTIME


 ORAL


   1/20/21 21:00


 4/20/21 20:59   





 


 Azithromycin 500


 mg/Dextrose  275 ml @ 


 275 mls/hr  Q24HRS


 IV


   1/20/21 15:00


 1/26/21 15:59  1/20/21 15:52





 


 Ceftriaxone


 Sodium 1 gm/


 Dextrose  55 ml @ 


 110 mls/hr  Q24H


 IVPB


   1/20/21 14:00


 1/27/21 13:59  1/20/21 17:00





 


 Dexamethasone


 Sodium Phosphate


  (Decadron 10mg/


 ml Inj)  6 mg  DAILY


 IV


   1/21/21 09:00


 1/30/21 08:59  1/21/21 08:10





 


 Dextrose/Sodium


 Chloride  1,000 ml @ 


 75 mls/hr  [id number]


 IV


   1/20/21 13:30


 2/19/21 13:29  1/21/21 02:00





 


 Heparin Sodium


  (Porcine)


  (Heparin 5000


 units/ml)  5,000 units  EVERY 12  HOURS


 SUBQ


   1/21/21 09:00


 3/7/21 08:59   





 


 Sodium


 Hypochlorite


  (Dakin's Quarter


 Strength)  1 applic  DAILY


 TOPIC


   1/21/21 09:00


 2/20/21 08:59   




















Jer Downs MD               Jan 21, 2021 09:03

## 2021-01-21 NOTE — NUR
NURSE NOTES:

Received report from DENY Hernandez. Patient open her eyes without tracking. SR on the monitor. ETT 
7.5/22cm at lip line with vent setting AC 16, , Peep 5 and FiO2 50%. Left NGT intact 
and clamped. Crawford intact and draining with yellow color urine. Right FA 20 IV and Left FA 
20G IV intact and running with D5 and 1/2 NS @75ml/hr. Will continue plan of care.

## 2021-01-21 NOTE — NUR
CASE MANAGEMENT:INITIAL REVIEW



75 YR OLD FEMALE BIBA FROM [place]TRY [facility]



CC;DYSPNEA. RESPIRATORY DISTRESS



SI;COVID PNEUMONIA. RESPIRATORY FAILURE. RBBB.

99.7   130   36   160/89   96% ON Bi-PAP

WBC+ 13.3   BUN+ 20   GLU+ 154   MAG- 1.5   FERRITIN+ 660

LDH+ 309   TROP+ 0.059   CRP+ 14.0   BNP+ 8374   ALB- 1.7

PT+ 12.0   D-DIMER+ 6.00

UA+ PROTEIN, BLOOD, UROBILINOGEN, LEUKOCYTE ESTERASE,

RBC, WBC, SQUAMOUS EPITH CELLS, BACTERIA, YEAST

CXR ~ Extensive bilateral infiltrates which may be related to multifocal

pneumonia, including COVID pneumonia. Please correlate clinically.



IS;ZOSYN IV

DECADRON IV

TYLENOL RECTAL

LOVENOX SQ

MAG SULFATE IV

IVF NS BOLUS



*****ADMITTED TO ICU******

******ICU STATUS******

DCP;PENDING HOSPITAL STAY

## 2021-01-21 NOTE — NUR
NURSE NOTES:

Talked with Janet Claudio/Daughter. She refused blood transfusion. Explained benefits and 
risks x3. Still refused. She said she will think about code status as well. She will call us 
again.

## 2021-01-21 NOTE — NUR
NURSE NOTES:

Seen by Dr. Ventura and assessed patient. Updated patient's status. Ordered 2 units of pRBC. 
Will continue plan of care.

## 2021-01-21 NOTE — INTERNAL MED PROGRESS NOTE
Subjective


Physician Name


Shyla Bruno


Attending Physician


Shyla Bruno M.D.





Current Medications








 Medications


  (Trade)  Dose


 Ordered  Sig/Kvng


 Route


 PRN Reason  Start Time


 Stop Time Status Last Admin


Dose Admin


 


 Acetaminophen


  (Tylenol)  650 mg  Q4H  PRN


 ORAL


 FEVER  1/20/21 13:30


 2/19/21 13:29   





 


 Acetaminophen


  (Tylenol)  650 mg  Q4H  PRN


 ORAL


 Mild Pain (Pain Scale 1-3)  1/20/21 13:30


 2/19/21 13:29   





 


 Aspirin


  (Ecotrin)  81 mg  DAILY


 ORAL


   1/21/21 09:00


 3/7/21 08:59   





 


 Atorvastatin


 Calcium


  (Lipitor)  20 mg  BEDTIME


 ORAL


   1/20/21 21:00


 4/20/21 20:59   





 


 Azithromycin 500


 mg/Dextrose  275 ml @ 


 275 mls/hr  Q24HRS


 IV


   1/20/21 15:00


 1/26/21 15:59  1/20/21 15:52





 


 Ceftriaxone


 Sodium 1 gm/


 Dextrose  55 ml @ 


 110 mls/hr  Q24H


 IVPB


   1/20/21 14:00


 1/27/21 13:59  1/20/21 17:00





 


 Dexamethasone


 Sodium Phosphate


  (Decadron 10mg/


 ml Inj)  6 mg  DAILY


 IV


   1/21/21 09:00


 1/30/21 08:59   





 


 Dextrose/Sodium


 Chloride  1,000 ml @ 


 75 mls/hr  [id number]


 IV


   1/20/21 13:30


 2/19/21 13:29  1/21/21 02:00





 


 Heparin Sodium


  (Porcine)


  (Heparin 5000


 units/ml)  5,000 units  EVERY 12  HOURS


 SUBQ


   1/21/21 09:00


 3/7/21 08:59   





 


 Metoprolol


 Tartrate


  (Lopressor)  25 mg  Q12HR


 ORAL


   1/20/21 21:00


 4/20/21 20:59   





 


 Sodium


 Hypochlorite


  (Dakin's Quarter


 Strength)  1 applic  DAILY


 TOPIC


   1/21/21 09:00


 2/20/21 08:59   











Allergies:  


Coded Allergies:  


     No Known Allergies (Unverified , 1/20/21)


ROS Limited/Unobtainable:  Yes


Subjective


intubated overnight


discussed with daughter extensively 


would like to transition to comfort care





Objective





Last Vital Signs








  Date Time  Temp Pulse Resp B/P (MAP) Pulse Ox O2 Delivery O2 Flow Rate FiO2


 


1/21/21 07:00  106 15 138/67 (90) 100   


 


1/21/21 04:00      Mechanical Ventilator  


 


1/21/21 04:00        50


 


1/21/21 04:00 98.4       


 


1/20/21 11:15       15.0 











Laboratory Tests








Test


 1/20/21


14:45 1/20/21


19:25 1/21/21


04:05


 


Magnesium Level


 1.9 MG/DL


(1.8-2.4) 


 





 


Arterial Blood pH


 


 7.479


(7.350-7.450) 





 


Arterial Blood Partial


Pressure CO2 


 28.3 mmHg


(35.0-45.0)  L 





 


Arterial Blood Partial


Pressure O2 


 163.2 mmHg


(75.0-100.0)  H 





 


Arterial Blood HCO3


 


 20.6 mmol/L


(22.0-26.0)  L 





 


Arterial Blood Oxygen


Saturation 


 98.3 %


() 





 


Arterial Blood Base Excess  -2.3 (-2-2)  L 


 


Steve Test  Positive   


 


White Blood Count


 


 


 9.5 K/UL


(4.8-10.8)


 


Red Blood Count


 


 


 2.62 M/UL


(4.20-5.40)  L


 


Hemoglobin


 


 


 7.4 G/DL


(12.0-16.0)  L


 


Hematocrit


 


 


 23.2 %


(37.0-47.0)  L


 


Mean Corpuscular Volume   89 FL (80-99)  


 


Mean Corpuscular Hemoglobin


 


 


 28.3 PG


(27.0-31.0)


 


Mean Corpuscular Hemoglobin


Concent 


 


 31.9 G/DL


(32.0-36.0)  L


 


Red Cell Distribution Width


 


 


 16.3 %


(11.6-14.8)  H


 


Platelet Count


 


 


 203 K/UL


(150-450)


 


Mean Platelet Volume


 


 


 7.2 FL


(6.5-10.1)


 


Neutrophils (%) (Auto)


 


 


 % (45.0-75.0)





 


Lymphocytes (%) (Auto)


 


 


 % (20.0-45.0)





 


Monocytes (%) (Auto)    % (1.0-10.0)  


 


Eosinophils (%) (Auto)    % (0.0-3.0)  


 


Basophils (%) (Auto)    % (0.0-2.0)  


 


Neutrophils % (Manual)   Pending  


 


Lymphocytes % (Manual)   Pending  


 


Platelet Estimate   Pending  


 


Platelet Morphology   Pending  


 


Sodium Level   Pending  


 


Potassium Level   Pending  


 


Chloride Level   Pending  


 


Carbon Dioxide Level   Pending  


 


Blood Urea Nitrogen   Pending  


 


Creatinine   Pending  


 


Estimat Glomerular Filtration


Rate 


 


 Pending  





 


Glucose Level   Pending  


 


Calcium Level   Pending  


 


Phosphorus Level


 


 


 2.7 MG/DL


(2.5-4.9)


 


Troponin I


 


 


 0.043 ng/mL


(0.000-0.056)


 


Pro-B-Type Natriuretic Peptide


 


 


 6376 pg/mL


(0-125)  H











Microbiology








 Date/Time


Source Procedure


Growth Status





 


 1/20/21 03:45


Nasal Nares - Final Complete


 


 1/20/21 03:45


Nasal Nares - Final Complete

















Intake and Output  


 


 1/20/21 1/21/21





 19:00 07:00


 


Intake Total 1180 ml 825 ml


 


Output Total 100 ml 370 ml


 


Balance 1080 ml 455 ml


 


  


 


Intake IV Total 1180 ml 825 ml


 


Output Urine Total 100 ml 370 ml











Assessment/Plan


Assessment/Plan


#Hypoxemic resp failure due to covid pneumonia


#Covid pneumonia


#Septic shock


#HLD


#h/o hypertension


#h/o dementia


#anemia





- admit to ICU


- intubated 





intubated overnight


discussed with daughter extensively 


would like to transition to comfort care





- on azithro and ceftriaxone


- IVF


- on dex


- defer remdesevir to ID


- ID eval


- cardiology eval


- gen surg eval for decub ulcer 


- monitor lytes 


- monitor hemoglobin











Shyla Bruno M.D.              Jan 21, 2021 08:03

## 2021-01-21 NOTE — NUR
NURSE HAND-OFF REPORT: 



Latest Vital Signs: Temperature 98.8 , Pulse 99 , B/P 119 /75 , Respiratory Rate 17 , O2 SAT 
97 , Venturi Mask, O2 Flow Rate 15.0 .  

Vital Sign Comment: 



EKG Rhythm: Sinus Rhythm

Rhythm change?: N 

MD Notified?: AUDRA Bruno MD Response: Order Received& Read Back



Latest Souza Fall Score: 50  

Fall Risk: High Risk 

Safety Measures: Call light Within Reach, Bed Alarm Zone 1, Side Rails Side Rails x2, Bed 
position Low and Locked.

Fall Precautions: 

Yellow Socks

Yellow Gown

Door Sign

Patient Fall Education



Report given to [facility] rn using sbar.

## 2021-01-21 NOTE — NUR
NURSE NOTES:

received report from semaj carballo  pt sedated with driprivan drip at 5mcg/kg/per hr -2 ebonie  pt 
orally intubated -vent  no acute resp distress noted tolerating tube feeding no residual 
urnary output good reposition and suction  iv infusing well  dressing dry and intact 

-------------------------------------------------------------------------------

Addendum: 01/21/21 at 1950 by FARIBA ADDISON RN

-------------------------------------------------------------------------------

wrong pt

## 2021-01-21 NOTE — NUR
NURSE HAND-OFF REPORT: 



Latest Vital Signs: Temperature 98.4 , Pulse 106 , B/P 138 /67 , Respiratory Rate 15 , O2 
 , Bi-pap, O2 Flow Rate 15.0 .  

Vital Sign Comment: stable



EKG Rhythm: Sinus Rhythm

Rhythm change?: N 

MD Notified?: AUDRA Bruno MD Response: Order Received& Read Back



Latest Souza Fall Score: 50  

Fall Risk: High Risk 

Safety Measures: Call light Within Reach, Bed Alarm Zone 1, Side Rails Side Rails x2, Bed 
position Low and Locked.

Fall Precautions: 

Yellow Socks

Yellow Gown

Door Sign

Patient Fall Education



Report given to DENY Perkins.

## 2021-01-21 NOTE — NUR
NURSE NOTES:

Talked with Janet/Daughter. She can come to see patient. She said okay to extubate patient.

## 2021-01-21 NOTE — CARDIAC ELECTROPHYSIOLOGY PN
Assessment/Plan


Assessment/Plan


1. Elevated troponin of 0.059.  The patient does not have renal failure.


This could be due to COVID pneumonia.  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


myocardial infarction and right bundle-branch block. 


On aspirin and metoprolol and Lipitor.  EF 55% on  echocardiogram 





2. Right bundle-branch block.


3. Sinus tachycardia at 113 due to the patient's COVID pneumonia.


4. COVID pneumonia respiratory failure on the vent and dexamethasone,


azithromycin, and ceftriaxone.





Subjective


Subjective


Coded for high CO2 and resp failure, intubated in ICU on 40% Fio2 off pressors 

in covid isolation


Off restraints and no sedation





Objective





Last 24 Hour Vital Signs








  Date Time  Temp Pulse Resp B/P (MAP) Pulse Ox O2 Delivery O2 Flow Rate FiO2


 


1/21/21 07:00  106 15 138/67 (90) 100   


 


1/21/21 06:00  95 18 110/58 (75) 96   


 


1/21/21 05:00  89 15 110/56 (74) 98   


 


1/21/21 04:00      Mechanical Ventilator  


 


1/21/21 04:00  95      


 


1/21/21 04:00        50


 


1/21/21 04:00 98.4 93 15 99/55 (70) 99   


 


1/21/21 03:56  100 22     50


 


1/21/21 03:00  89 16 116/64 (81) 100   


 


1/21/21 02:00  91 17 109/58 (75) 100   


 


1/21/21 01:00  89 15 101/49 (66) 99   


 


1/21/21 00:00  123      


 


1/21/21 00:00 98.7 101 22 117/57 (77) 97   


 


1/21/21 00:00      Mechanical Ventilator  


 


1/20/21 23:19  94 20     60


 


1/20/21 23:00  102 19 140/74 (96) 100   


 


1/20/21 22:00  85 15 116/67 (83) 100   


 


1/20/21 21:00  99 19 119/57 (77) 100   


 


1/20/21 20:00 98.3 96 16 110/65 (80) 100   


 


1/20/21 20:00      Mechanical Ventilator  


 


1/20/21 20:00        80


 


1/20/21 19:22  129 38  80   


 


1/20/21 19:19  96 18     80


 


1/20/21 19:00  80 14 110/57 (74) 100   


 


1/20/21 18:00 99.4 76 17 102/64 (77) 100   


 


1/20/21 17:53        100


 


1/20/21 17:30  75      


 


1/20/21 17:02  87 16  99 Mechanical Ventilator  100


 


1/20/21 17:02  87 16     100


 


1/20/21 16:16  120      


 


1/20/21 16:00 98.7 89 17 129/72 (91) 98   


 


1/20/21 16:00      Bi-pap  


 


1/20/21 15:33  111      


 


1/20/21 15:21  117 20  96   100


 


1/20/21 12:23      Bi-pap  


 


1/20/21 11:58  75      


 


1/20/21 11:20  67 17  97 Bi-Pap  100


 


1/20/21 11:20  67 17  97   100


 


1/20/21 11:15 98.3 84 23 143/76 100 Bi-pap 15.0 100


 


1/20/21 11:02 98.3 84 23 143/76 100 Bi-pap 15.0 100

















Intake and Output  


 


 1/20/21 1/21/21





 19:00 07:00


 


Intake Total 1180 ml 825 ml


 


Output Total 100 ml 370 ml


 


Balance 1080 ml 455 ml


 


  


 


Intake IV Total 1180 ml 825 ml


 


Output Urine Total 100 ml 370 ml











Laboratory Tests








Test


 1/20/21


14:45 1/20/21


19:25 1/21/21


04:05


 


Magnesium Level


 1.9 MG/DL


(1.8-2.4) 


 1.8 MG/DL


(1.8-2.4)


 


Arterial Blood pH


 


 7.479


(7.350-7.450) 





 


Arterial Blood Partial


Pressure CO2 


 28.3 mmHg


(35.0-45.0)  L 





 


Arterial Blood Partial


Pressure O2 


 163.2 mmHg


(75.0-100.0)  H 





 


Arterial Blood HCO3


 


 20.6 mmol/L


(22.0-26.0)  L 





 


Arterial Blood Oxygen


Saturation 


 98.3 %


() 





 


Arterial Blood Base Excess  -2.3 (-2-2)  L 


 


Steve Test  Positive   


 


White Blood Count


 


 


 9.5 K/UL


(4.8-10.8)


 


Red Blood Count


 


 


 2.62 M/UL


(4.20-5.40)  L


 


Hemoglobin


 


 


 7.4 G/DL


(12.0-16.0)  L


 


Hematocrit


 


 


 23.2 %


(37.0-47.0)  L


 


Mean Corpuscular Volume   89 FL (80-99)  


 


Mean Corpuscular Hemoglobin


 


 


 28.3 PG


(27.0-31.0)


 


Mean Corpuscular Hemoglobin


Concent 


 


 31.9 G/DL


(32.0-36.0)  L


 


Red Cell Distribution Width


 


 


 16.3 %


(11.6-14.8)  H


 


Platelet Count


 


 


 203 K/UL


(150-450)


 


Mean Platelet Volume


 


 


 7.2 FL


(6.5-10.1)


 


Neutrophils (%) (Auto)


 


 


 % (45.0-75.0)





 


Lymphocytes (%) (Auto)


 


 


 % (20.0-45.0)





 


Monocytes (%) (Auto)    % (1.0-10.0)  


 


Eosinophils (%) (Auto)    % (0.0-3.0)  


 


Basophils (%) (Auto)    % (0.0-2.0)  


 


Differential Total Cells


Counted 


 


 100  





 


Neutrophils % (Manual)   84 % (45-75)  H


 


Lymphocytes % (Manual)   12 % (20-45)  L


 


Monocytes % (Manual)   4 % (1-10)  


 


Eosinophils % (Manual)   0 % (0-3)  


 


Basophils % (Manual)   0 % (0-2)  


 


Band Neutrophils   0 % (0-8)  


 


Platelet Estimate   Adequate  


 


Platelet Morphology   Normal  


 


Hypochromasia   2+  


 


Anisocytosis   2+  


 


Sodium Level


 


 


 136 MMOL/L


(136-145)


 


Potassium Level


 


 


 3.8 MMOL/L


(3.5-5.1)


 


Chloride Level


 


 


 104 MMOL/L


()


 


Carbon Dioxide Level


 


 


 25 MMOL/L


(21-32)


 


Anion Gap


 


 


 7 mmol/L


(5-15)


 


Blood Urea Nitrogen


 


 


 22 mg/dL


(7-18)  H


 


Creatinine


 


 


 0.7 MG/DL


(0.55-1.30)


 


Estimat Glomerular Filtration


Rate 


 


 > 60 mL/min


(>60)


 


Glucose Level


 


 


 165 MG/DL


()  H


 


Calcium Level


 


 


 7.9 MG/DL


(8.5-10.1)  L


 


Phosphorus Level


 


 


 2.7 MG/DL


(2.5-4.9)


 


Iron Level


 


 


 23 ug/dL


()  L


 


Total Iron Binding Capacity


 


 


 131 ug/dL


(250-450)  L


 


Percent Iron Saturation   18 % (15-50)  


 


Unsaturated Iron Binding


 


 


 108 ug/dL


(112-346)  L


 


Ferritin


 


 


 852 NG/ML


(8-388)  H


 


Troponin I


 


 


 0.043 ng/mL


(0.000-0.056)


 


Pro-B-Type Natriuretic Peptide


 


 


 6376 pg/mL


(0-125)  H











Microbiology








 Date/Time


Source Procedure


Growth Status





 


 1/20/21 03:45


Nasal Nares - Final Complete


 


 1/20/21 03:45


Nasal Nares - Final Complete








Objective


HEAD AND NECK:  No JVD.Orally intubated


LUNGS:  Coarse rhonchi.


CARDIOVASCULAR:  Regular S1 and S2 with no gallop.


ABDOMEN:  Soft.


EXTREMITIES:  No pitting edema.











Meño Capone MD               Jan 21, 2021 10:22

## 2021-01-22 VITALS — SYSTOLIC BLOOD PRESSURE: 129 MMHG | DIASTOLIC BLOOD PRESSURE: 84 MMHG

## 2021-01-22 VITALS — SYSTOLIC BLOOD PRESSURE: 115 MMHG | DIASTOLIC BLOOD PRESSURE: 75 MMHG

## 2021-01-22 VITALS — SYSTOLIC BLOOD PRESSURE: 117 MMHG | DIASTOLIC BLOOD PRESSURE: 77 MMHG

## 2021-01-22 VITALS — DIASTOLIC BLOOD PRESSURE: 81 MMHG | SYSTOLIC BLOOD PRESSURE: 129 MMHG

## 2021-01-22 VITALS — DIASTOLIC BLOOD PRESSURE: 81 MMHG | SYSTOLIC BLOOD PRESSURE: 128 MMHG

## 2021-01-22 LAB
ADD MANUAL DIFF: NO
ALBUMIN SERPL-MCNC: 1.7 G/DL (ref 3.4–5)
ALBUMIN/GLOB SERPL: 0.4 {RATIO} (ref 1–2.7)
ALP SERPL-CCNC: 189 U/L (ref 46–116)
ALT SERPL-CCNC: 20 U/L (ref 12–78)
ANION GAP SERPL CALC-SCNC: 8 MMOL/L (ref 5–15)
AST SERPL-CCNC: 26 U/L (ref 15–37)
BASOPHILS NFR BLD AUTO: 0.1 % (ref 0–2)
BILIRUB SERPL-MCNC: 0.5 MG/DL (ref 0.2–1)
BUN SERPL-MCNC: 18 MG/DL (ref 7–18)
CALCIUM SERPL-MCNC: 8.3 MG/DL (ref 8.5–10.1)
CHLORIDE SERPL-SCNC: 107 MMOL/L (ref 98–107)
CO2 SERPL-SCNC: 25 MMOL/L (ref 21–32)
CREAT SERPL-MCNC: 0.7 MG/DL (ref 0.55–1.3)
EOSINOPHIL NFR BLD AUTO: 0 % (ref 0–3)
ERYTHROCYTE [DISTWIDTH] IN BLOOD BY AUTOMATED COUNT: 15.6 % (ref 11.6–14.8)
GLOBULIN SER-MCNC: 4.1 G/DL
HCT VFR BLD CALC: 26 % (ref 37–47)
HGB BLD-MCNC: 8.3 G/DL (ref 12–16)
LYMPHOCYTES NFR BLD AUTO: 12.9 % (ref 20–45)
MCV RBC AUTO: 89 FL (ref 80–99)
MONOCYTES NFR BLD AUTO: 6.9 % (ref 1–10)
NEUTROPHILS NFR BLD AUTO: 80 % (ref 45–75)
PHOSPHATE SERPL-MCNC: 2.5 MG/DL (ref 2.5–4.9)
PLATELET # BLD: 208 K/UL (ref 150–450)
POTASSIUM SERPL-SCNC: 3.6 MMOL/L (ref 3.5–5.1)
RBC # BLD AUTO: 2.92 M/UL (ref 4.2–5.4)
SODIUM SERPL-SCNC: 140 MMOL/L (ref 136–145)
WBC # BLD AUTO: 10.9 K/UL (ref 4.8–10.8)

## 2021-01-22 NOTE — NUR
NURSE HAND-OFF: 



Important Events on Shift: patient was transferred from ICU to , report received from 
DENY Fernandez. patient on nonrebreather 15L, no acute respiratory distress noted. NGT clamped. 
Patient on comfort measure, started Morphine continuous at 0.5 mg/hr, verified with 
DENY Rogers, started with 27.6mg in tubing, 23.7mg at shift change.  performed wound care,  
wound pictures taken and uploaded.  VSS throughout shift. 

Patient Status: stable

Diet: NPO



Pending Orders: N/A

Pending Results/Labs: N/A

Pending MD notification: N/A



Latest Vital Signs: Temperature 98.1 , Pulse 82 , B/P 115 /75 , Respiratory Rate 17 , O2 SAT 
99 , Venturi Mask, O2 Flow Rate 15.0 .  

Vital Sign Comment: stable



Latest Souza Fall Score: 50  

Fall Risk: High Risk 

Safety Measures: Call light Within Reach, Bed Alarm Zone 1, Side Rails Side Rails x2, Bed 
position Low and Locked.

Fall Precautions: 

Yellow Socks

Yellow Gown

Door Sign

Patient Fall Education



Report given to DENY Landis.

## 2021-01-22 NOTE — NUR
Nurses Notes

Pt lying in bed comfortable AAOx0. non verbal. No acute respiratory distress noted at this 
time.pt on non rebreather face  mask at 15 L/min. NG tube to left nostril clamped.  RFA 
infusing morphine @ 0.6PCA Morphine @0.5mg.hr

## 2021-01-22 NOTE — NUR
NURSE HAND-OFF: 



Important Events on Shift:N/A

Patient Status: Stable

Diet: NPO



Pending Orders: N/A

Pending Results/Labs: CBC, CMP, Mg, Phos on 1/23

Pending MD notification:N/A



Latest Vital Signs: Temperature 98.4 , Pulse 105 , B/P 129 /81 , Respiratory Rate 20 , O2 
SAT 96 , Venturi Mask, O2 Flow Rate 15.0 .  

Vital Sign Comment: Stable



Latest Souza Fall Score: 50  

Fall Risk: High Risk 

Safety Measures: Call light Within Reach, Bed Alarm Zone 1, Side Rails Side Rails x3, Bed 
position Low and Locked.

Fall Precautions: 

Yellow Socks

Yellow Gown

Door Sign

Patient Fall Education



Report given to Lara BARCLAY. Patient in stable condition.

## 2021-01-22 NOTE — CARDIAC ELECTROPHYSIOLOGY PN
Assessment/Plan


Assessment/Plan


1. Elevated troponin of 0.059.  The patient does not have renal failure.


This could be due to COVID pneumonia.  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


myocardial infarction and right bundle-branch block 


 EF 55% on  echocardiogram 





2. Right bundle-branch block.


3. Sinus tachycardia at 113 due to the patient's COVID pneumonia.


4. COVID pneumonia respiratory failure 


       Now extubated and comfort care





DW RN





Subjective


Subjective


Coded for high CO2 and resp failure and was intubated in ICU 


Now extubated in covid isolation on morphine drip as is now comfort care





Objective





Last 24 Hour Vital Signs








  Date Time  Temp Pulse Resp B/P (MAP) Pulse Ox O2 Delivery O2 Flow Rate FiO2


 


1/22/21 12:00 98.2 93 20 117/77 (90) 98   


 


1/22/21 09:00      Non-Rebreather 15.0 





      Non-Rebreather  


 


1/22/21 08:00 97.9 82 20 129/84 (99) 99   


 


1/22/21 00:00 98.1 82 17 115/75 (88) 99   


 


1/21/21 22:00  99 17 119/75 (90) 97   


 


1/21/21 21:00  99 17 119/75 (90) 97   


 


1/21/21 21:00        40


 


1/21/21 21:00  85      


 


1/21/21 20:00      Mechanical Ventilator  





      Non-Rebreather  


 


1/21/21 20:00 98.8 104 19 138/73 (94) 99   


 


1/21/21 19:10  82 17     40


 


1/21/21 19:00  87 19 109/55 (73) 96   


 


1/21/21 18:00  135 23 127/74 (91) 90   


 


1/21/21 17:00  82 17 113/61 (78) 97   


 


1/21/21 16:00 98.2 85 17 116/59 (78) 97   


 


1/21/21 16:00  85      


 


1/21/21 16:00      Mechanical Ventilator  


 


1/21/21 16:00        40


 


1/21/21 15:08  144 28     40


 


1/21/21 15:00  106 17 137/71 (93) 97   














l


Intake and Output  


 


 1/21/21 1/22/21





 19:00 07:00


 


Intake Total  2.5 ml


 


Output Total 590 ml 230 ml


 


Balance -590 ml -227.5 ml


 


  


 


Intake IV Total  2.5 ml


 


Output Urine Total 590 ml 230 ml











Laboratory Tests








Test


 1/22/21


06:30


 


White Blood Count


 10.9 K/UL


(4.8-10.8)  H


 


Red Blood Count


 2.92 M/UL


(4.20-5.40)  L


 


Hemoglobin


 8.3 G/DL


(12.0-16.0)  L


 


Hematocrit


 26.0 %


(37.0-47.0)  L


 


Mean Corpuscular Volume 89 FL (80-99)  


 


Mean Corpuscular Hemoglobin


 28.3 PG


(27.0-31.0)


 


Mean Corpuscular Hemoglobin


Concent 31.8 G/DL


(32.0-36.0)  L


 


Red Cell Distribution Width


 15.6 %


(11.6-14.8)  H


 


Platelet Count


 208 K/UL


(150-450)


 


Mean Platelet Volume


 7.3 FL


(6.5-10.1)


 


Neutrophils (%) (Auto)


 80.0 %


(45.0-75.0)  H


 


Lymphocytes (%) (Auto)


 12.9 %


(20.0-45.0)  L


 


Monocytes (%) (Auto)


 6.9 %


(1.0-10.0)


 


Eosinophils (%) (Auto)


 0.0 %


(0.0-3.0)


 


Basophils (%) (Auto)


 0.1 %


(0.0-2.0)


 


Sodium Level


 140 MMOL/L


(136-145)


 


Potassium Level


 3.6 MMOL/L


(3.5-5.1)


 


Chloride Level


 107 MMOL/L


()


 


Carbon Dioxide Level


 25 MMOL/L


(21-32)


 


Anion Gap


 8 mmol/L


(5-15)


 


Blood Urea Nitrogen


 18 mg/dL


(7-18)


 


Creatinine


 0.7 MG/DL


(0.55-1.30)


 


Estimat Glomerular Filtration


Rate > 60 mL/min


(>60)


 


Glucose Level


 102 MG/DL


()


 


Calcium Level


 8.3 MG/DL


(8.5-10.1)  L


 


Phosphorus Level


 2.5 MG/DL


(2.5-4.9)


 


Magnesium Level


 1.7 MG/DL


(1.8-2.4)  L


 


Total Bilirubin


 0.5 MG/DL


(0.2-1.0)


 


Aspartate Amino Transf


(AST/SGOT) 26 U/L (15-37)





 


Alanine Aminotransferase


(ALT/SGPT) 20 U/L (12-78)





 


Alkaline Phosphatase


 189 U/L


()  H


 


Total Protein


 5.8 G/DL


(6.4-8.2)  L


 


Albumin


 1.7 G/DL


(3.4-5.0)  L


 


Globulin 4.1 g/dL  


 


Albumin/Globulin Ratio


 0.4 (1.0-2.7)


L











Microbiology








 Date/Time


Source Procedure


Growth Status





 


 1/20/21 03:45


Indwelling Cath Urine Culture - Final


Enterococcus Faecium - Vre Complete


 


 1/20/21 03:45


Nasal Nares MRSA Culture - Final


NO METHICILLIN RESISTANT STAPH AUREUS... Complete





 1/20/21 03:45


Nasal Nares - Final Complete


 


 1/20/21 03:45


Nasal Nares - Final Complete


 


 1/20/21 03:45


Nasopharynx Coronavirus COVID-19 PCR (DAVID) - Final Complete


 


 1/20/21 03:45


Blood Blood Culture - Preliminary


NO GROWTH AFTER 24 HOURS Resulted


 


 1/20/21 03:30


Blood Blood Culture - Preliminary


NO GROWTH AFTER 24 HOURS Resulted








Objective


HEAD AND NECK:  No JVD. 


LUNGS:  Coarse rhonchi.


CARDIOVASCULAR:  Regular S1 and S2 with no gallop.


ABDOMEN:  Soft.


EXTREMITIES:  No pitting edema.











Meño Capone MD               Jan 22, 2021 14:23

## 2021-01-22 NOTE — NUR
NURSE NOTES:

Patient lying in bed sleeping. No signs and symptoms of pain or distress at this time. On 
nonrebreather 15L and PCA Morphine for comfort measure. NGT clamped. IV dressing intact and 
dry. Wound dressing intact and dry. Crawford catheter patent and draining well. Bed lowest 
position and side rails up. Call light within reach. Will continue to monitor.

## 2021-01-22 NOTE — NUR
SI:  Respiratory Failure, COVID PNA

      T 98.2, HR 93, RR 20, /77, O2 sat 98%

       NRM 15L

       WBC 10.9

      Cxray diffuse airspace opacities, small bilateral pleural effusions



IS:  MS GTT (comfort care) 

      Extubated yesterday



****Med/Surg Status*****

## 2021-01-22 NOTE — INTERNAL MED PROGRESS NOTE
Subjective


Physician Name


Shyla Bruno


Attending Physician


Shyla Bruno M.D.





Current Medications








 Medications


  (Trade)  Dose


 Ordered  Sig/Kvng


 Route


 PRN Reason  Start Time


 Stop Time Status Last Admin


Dose Admin


 


 Miscellaneous


 Medication


  (Narcotic Drip


 Rate Change)  1 ea  DAILY  PRN


 MISC


 .  1/21/21 18:30


 2/20/21 18:29   





 


 Miscellaneous


 Medication


  (Narcotic Shift


 Volume)  1 ea  Q12HR@0700,1900


 MISC


   1/21/21 19:00


 2/20/21 18:59  1/22/21 07:00





 


 Morphine Sulfate  30 ml @ 


 0.5 mls/hr  PCA Protocol PRN


 IV


 COMFORT CARE  1/21/21 18:15


 1/23/21 18:14  1/21/21 23:47











Allergies:  


Coded Allergies:  


     No Known Allergies (Unverified , 1/20/21)


Subjective


intubated overnight


discussed with daughter extensively 


would like to transition to comfort care





Objective





Last Vital Signs








  Date Time  Temp Pulse Resp B/P (MAP) Pulse Ox O2 Delivery O2 Flow Rate FiO2


 


1/22/21 00:00 98.1 82 17 115/75 (88) 99   


 


1/21/21 21:00        40


 


1/21/21 20:00      Mechanical Ventilator  





      Non-Rebreather  


 


1/20/21 11:15       15.0 











Laboratory Tests








Test


 1/21/21


13:22 1/22/21


06:30


 


Arterial Blood pH


 7.494


(7.350-7.450) 





 


Arterial Blood Partial


Pressure CO2 32.0 mmHg


(35.0-45.0)  L 





 


Arterial Blood Partial


Pressure O2 78.0 mmHg


(75.0-100.0) 





 


Arterial Blood HCO3


 24.1 mmol/L


(22.0-26.0) 





 


Arterial Blood Oxygen


Saturation 95.0 %


() 





 


Arterial Blood Base Excess 1.0 (-2-2)   


 


Steve Test Positive   


 


White Blood Count


 


 10.9 K/UL


(4.8-10.8)  H


 


Red Blood Count


 


 2.92 M/UL


(4.20-5.40)  L


 


Hemoglobin


 


 8.3 G/DL


(12.0-16.0)  L


 


Hematocrit


 


 26.0 %


(37.0-47.0)  L


 


Mean Corpuscular Volume  89 FL (80-99)  


 


Mean Corpuscular Hemoglobin


 


 28.3 PG


(27.0-31.0)


 


Mean Corpuscular Hemoglobin


Concent 


 31.8 G/DL


(32.0-36.0)  L


 


Red Cell Distribution Width


 


 15.6 %


(11.6-14.8)  H


 


Platelet Count


 


 208 K/UL


(150-450)


 


Mean Platelet Volume


 


 7.3 FL


(6.5-10.1)


 


Neutrophils (%) (Auto)


 


 80.0 %


(45.0-75.0)  H


 


Lymphocytes (%) (Auto)


 


 12.9 %


(20.0-45.0)  L


 


Monocytes (%) (Auto)


 


 6.9 %


(1.0-10.0)


 


Eosinophils (%) (Auto)


 


 0.0 %


(0.0-3.0)


 


Basophils (%) (Auto)


 


 0.1 %


(0.0-2.0)


 


Sodium Level


 


 140 MMOL/L


(136-145)


 


Potassium Level


 


 3.6 MMOL/L


(3.5-5.1)


 


Chloride Level


 


 107 MMOL/L


()


 


Carbon Dioxide Level


 


 25 MMOL/L


(21-32)


 


Anion Gap


 


 8 mmol/L


(5-15)


 


Blood Urea Nitrogen


 


 18 mg/dL


(7-18)


 


Creatinine


 


 0.7 MG/DL


(0.55-1.30)


 


Estimat Glomerular Filtration


Rate 


 > 60 mL/min


(>60)


 


Glucose Level


 


 102 MG/DL


()


 


Calcium Level


 


 8.3 MG/DL


(8.5-10.1)  L


 


Phosphorus Level


 


 2.5 MG/DL


(2.5-4.9)


 


Magnesium Level


 


 1.7 MG/DL


(1.8-2.4)  L


 


Total Bilirubin


 


 0.5 MG/DL


(0.2-1.0)


 


Aspartate Amino Transf


(AST/SGOT) 


 26 U/L (15-37)





 


Alanine Aminotransferase


(ALT/SGPT) 


 20 U/L (12-78)





 


Alkaline Phosphatase


 


 189 U/L


()  H


 


Total Protein


 


 5.8 G/DL


(6.4-8.2)  L


 


Albumin


 


 1.7 G/DL


(3.4-5.0)  L


 


Globulin  4.1 g/dL  


 


Albumin/Globulin Ratio


 


 0.4 (1.0-2.7)


L











Microbiology








 Date/Time


Source Procedure


Growth Status





 


 1/20/21 03:45


Indwelling Cath Urine Culture - Final


Enterococcus Faecium - Vre Complete


 


 1/20/21 03:45


Nasal Nares MRSA Culture - Final


NO METHICILLIN RESISTANT STAPH AUREUS... Complete





 1/20/21 03:45


Nasal Nares - Final Complete


 


 1/20/21 03:45


Nasal Nares - Final Complete


 


 1/20/21 03:45


Blood Blood Culture - Preliminary


NO GROWTH AFTER 24 HOURS Resulted


 


 1/20/21 03:30


Blood Blood Culture - Preliminary


NO GROWTH AFTER 24 HOURS Resulted

















Intake and Output  


 


 1/21/21 1/22/21





 19:00 07:00


 


Intake Total  2.5 ml


 


Output Total 590 ml 230 ml


 


Balance -590 ml -227.5 ml


 


  


 


Intake IV Total  2.5 ml


 


Output Urine Total 590 ml 230 ml











Assessment/Plan


Assessment/Plan


#Hypoxemic resp failure due to covid pneumonia


#Covid pneumonia


#Septic shock


#HLD


#h/o hypertension


#h/o dementia


#anemia





- admit to ICU


- intubated 





intubated overnight


discussed with daughter extensively 


would like to transition to comfort care





- on azithro and ceftriaxone


- IVF


- on dex


- defer remdesevir to ID


- ID eval


- cardiology eval


- gen surg eval for decub ulcer 


- monitor lytes 


- monitor hemoglobin











Shyla Bruno M.D.              Jan 22, 2021 09:31

## 2021-01-22 NOTE — PULMONOLOGY PROGRESS NOTE
Subjective


ROS Limited/Unobtainable:  Yes


Interval Events:  now DNR/DNI with s/p terminal extubation


Constitutional:  Reports: no symptoms, other - transferred to Med/surg


HEENT:  Repors: no symptoms


Respiratory:  Reports: no symptoms


Cardiovascular:  Reports: no symptoms


Gastrointestinal/Abdominal:  Reports: no symptoms


Genitourinary:  Reports: no symptoms


Allergies:  


Coded Allergies:  


     No Known Allergies (Unverified , 1/20/21)





Objective





Last 24 Hour Vital Signs








  Date Time  Temp Pulse Resp B/P (MAP) Pulse Ox O2 Delivery O2 Flow Rate FiO2


 


1/22/21 00:00 98.1 82 17 115/75 (88) 99   


 


1/21/21 22:00  99 17 119/75 (90) 97   


 


1/21/21 21:00  99 17 119/75 (90) 97   


 


1/21/21 21:00        40


 


1/21/21 21:00  85      


 


1/21/21 20:00      Mechanical Ventilator  





      Non-Rebreather  


 


1/21/21 20:00 98.8 104 19 138/73 (94) 99   


 


1/21/21 19:10  82 17     40


 


1/21/21 19:00  87 19 109/55 (73) 96   


 


1/21/21 18:00  135 23 127/74 (91) 90   


 


1/21/21 17:00  82 17 113/61 (78) 97   


 


1/21/21 16:00 98.2 85 17 116/59 (78) 97   


 


1/21/21 16:00  85      


 


1/21/21 16:00      Mechanical Ventilator  


 


1/21/21 16:00        40


 


1/21/21 15:08  144 28     40


 


1/21/21 15:00  106 17 137/71 (93) 97   


 


1/21/21 14:00  86 17 118/74 (89) 97   


 


1/21/21 13:00  94 18 132/78 (96) 97   


 


1/21/21 12:00      Mechanical Ventilator  


 


1/21/21 12:00        50


 


1/21/21 12:00  81      


 


1/21/21 12:00  89 17 114/72 (86) 96   


 


1/21/21 12:00 98.4 89 17 114/72 (86) 96   


 


1/21/21 11:28  80 24     40


 


1/21/21 11:00  97 21 116/65 (82) 97   


 


1/21/21 10:00  87 16 125/65 (85) 98   


 


1/21/21 09:20      Venturi Mask  100


 


1/21/21 09:00 98.0 101 22 106/69 (81) 97   

















Intake and Output  


 


 1/21/21 1/22/21





 19:00 07:00


 


Intake Total  2.5 ml


 


Output Total 590 ml 230 ml


 


Balance -590 ml -227.5 ml


 


  


 


Intake IV Total  2.5 ml


 


Output Urine Total 590 ml 230 ml








General Appearance:  no acute distress


HEENT:  normocephalic


Respiratory:  chest wall non-tender


Cardiovascular:  normal peripheral pulses, normal rate


Abdomen:  normal bowel sounds


Extremities:  no cyanosis





Microbiology








 Date/Time


Source Procedure


Growth Status





 


 1/20/21 03:45


Indwelling Cath Urine Culture - Final


Enterococcus Faecium - Vre Complete


 


 1/20/21 03:45


Nasal Nares MRSA Culture - Final


NO METHICILLIN RESISTANT STAPH AUREUS... Complete





 1/20/21 03:45


Nasal Nares - Final Complete


 


 1/20/21 03:45


Nasal Nares - Final Complete


 


 1/20/21 03:45


Blood Blood Culture - Preliminary


NO GROWTH AFTER 24 HOURS Resulted


 


 1/20/21 03:30


Blood Blood Culture - Preliminary


NO GROWTH AFTER 24 HOURS Resulted








Laboratory Tests


1/21/21 13:22: 


Arterial Blood pH 7.494H, Arterial Blood Partial Pressure CO2 32.0L, Arterial 

Blood Partial Pressure O2 78.0, Arterial Blood HCO3 24.1, Arterial Blood Oxygen 

Saturation 95.0, Arterial Blood Base Excess 1.0, Steev Test Positive


1/22/21 06:30: 


White Blood Count 10.9H, Red Blood Count 2.92L, Hemoglobin 8.3L, Hematocrit 

26.0L, Mean Corpuscular Volume 89, Mean Corpuscular Hemoglobin 28.3, Mean 

Corpuscular Hemoglobin Concent 31.8L, Red Cell Distribution Width 15.6H, 

Platelet Count 208, Mean Platelet Volume 7.3, Neutrophils (%) (Auto) 80.0H, 

Lymphocytes (%) (Auto) 12.9L, Monocytes (%) (Auto) 6.9, Eosinophils (%) (Auto) 

0.0, Basophils (%) (Auto) 0.1, Sodium Level 140, Potassium Level 3.6, Chloride 

Level 107, Carbon Dioxide Level 25, Anion Gap 8, Blood Urea Nitrogen 18, 

Creatinine 0.7, Estimat Glomerular Filtration Rate > 60, Glucose Level 102, 

Calcium Level 8.3L, Phosphorus Level 2.5, Magnesium Level 1.7L, Total Bilirubin 

0.5, Aspartate Amino Transf (AST/SGOT) 26, Alanine Aminotransferase (ALT/SGPT) 

20, Alkaline Phosphatase 189H, Total Protein 5.8L, Albumin 1.7L, Globulin 4.1, 

Albumin/Globulin Ratio 0.4L





Current Medications








 Medications


  (Trade)  Dose


 Ordered  Sig/Kvng


 Route


 PRN Reason  Start Time


 Stop Time Status Last Admin


Dose Admin


 


 Miscellaneous


 Medication


  (Narcotic Drip


 Rate Change)  1 ea  DAILY  PRN


 MISC


 .  1/21/21 18:30


 2/20/21 18:29   





 


 Miscellaneous


 Medication


  (Narcotic Shift


 Volume)  1 ea  Q12HR@0700,1900


 MISC


   1/21/21 19:00


 2/20/21 18:59  1/22/21 07:00





 


 Morphine Sulfate  30 ml @ 


 0.5 mls/hr  PCA Protocol PRN


 IV


 COMFORT CARE  1/21/21 18:15


 1/23/21 18:14  1/21/21 23:47














Assessment/Plan


Assessment/Plan


1. COVID-19 pneumonia with hypoxia; initial testing from outside source


   -now off broad-spectrum antibiotics, dexamethasone 


   - In-house COVID-19 PCR result pending


2. Elevated inflammatory markers


   - Lovenox for DVT prophylaxis


3. Respiratory Failure


   -Now terminally extubated, DNR/DNI


   - now on NRB saturating at 98%


   - continue supplemental oxygen to keep SaO2 >90%


Leukocytosis


   - improving


Anemia


   - stable H&H


Hyperglycemia


Troponin leak


Elevated BNP


OTTO





DNR/DNI





The care for this patient was discussed with my supervising physician


Time spent for this case was approximately 31 minutes











Ankit Todd                 Jan 22, 2021 08:54

## 2021-01-22 NOTE — SURGERY PROGRESS NOTE
Surgery Progress Note


Subjective


Additional Comments


on face mask


labs noted


no n/v


comfortable appearing





Objective





Last 24 Hour Vital Signs








  Date Time  Temp Pulse Resp B/P (MAP) Pulse Ox O2 Delivery O2 Flow Rate FiO2


 


1/22/21 00:00 98.1 82 17 115/75 (88) 99   


 


1/21/21 22:00  99 17 119/75 (90) 97   


 


1/21/21 21:00  99 17 119/75 (90) 97   


 


1/21/21 21:00        40


 


1/21/21 21:00  85      


 


1/21/21 20:00      Mechanical Ventilator  





      Non-Rebreather  


 


1/21/21 20:00 98.8 104 19 138/73 (94) 99   


 


1/21/21 19:10  82 17     40


 


1/21/21 19:00  87 19 109/55 (73) 96   


 


1/21/21 18:00  135 23 127/74 (91) 90   


 


1/21/21 17:00  82 17 113/61 (78) 97   


 


1/21/21 16:00 98.2 85 17 116/59 (78) 97   


 


1/21/21 16:00  85      


 


1/21/21 16:00      Mechanical Ventilator  


 


1/21/21 16:00        40


 


1/21/21 15:08  144 28     40


 


1/21/21 15:00  106 17 137/71 (93) 97   


 


1/21/21 14:00  86 17 118/74 (89) 97   


 


1/21/21 13:00  94 18 132/78 (96) 97   


 


1/21/21 12:00      Mechanical Ventilator  


 


1/21/21 12:00        50


 


1/21/21 12:00  81      


 


1/21/21 12:00  89 17 114/72 (86) 96   


 


1/21/21 12:00 98.4 89 17 114/72 (86) 96   


 


1/21/21 11:28  80 24     40


 


1/21/21 11:00  97 21 116/65 (82) 97   








I&O











Intake and Output  


 


 1/21/21 1/22/21





 19:00 07:00


 


Intake Total  2.5 ml


 


Output Total 590 ml 230 ml


 


Balance -590 ml -227.5 ml


 


  


 


Intake IV Total  2.5 ml


 


Output Urine Total 590 ml 230 ml








Dressing:  saturated


Cardiovascular:  RSR


Respiratory:  decreased breath sounds


Abdomen:  soft, non-tender, present bowel sounds, non-distended


Extremities:  no tenderness, no cyanosis





Laboratory Tests








Test


 1/21/21


13:22 1/22/21


06:30


 


Arterial Blood pH


 7.494


(7.350-7.450) 





 


Arterial Blood Partial


Pressure CO2 32.0 mmHg


(35.0-45.0)  L 





 


Arterial Blood Partial


Pressure O2 78.0 mmHg


(75.0-100.0) 





 


Arterial Blood HCO3


 24.1 mmol/L


(22.0-26.0) 





 


Arterial Blood Oxygen


Saturation 95.0 %


() 





 


Arterial Blood Base Excess 1.0 (-2-2)   


 


Steve Test Positive   


 


White Blood Count


 


 10.9 K/UL


(4.8-10.8)  H


 


Red Blood Count


 


 2.92 M/UL


(4.20-5.40)  L


 


Hemoglobin


 


 8.3 G/DL


(12.0-16.0)  L


 


Hematocrit


 


 26.0 %


(37.0-47.0)  L


 


Mean Corpuscular Volume  89 FL (80-99)  


 


Mean Corpuscular Hemoglobin


 


 28.3 PG


(27.0-31.0)


 


Mean Corpuscular Hemoglobin


Concent 


 31.8 G/DL


(32.0-36.0)  L


 


Red Cell Distribution Width


 


 15.6 %


(11.6-14.8)  H


 


Platelet Count


 


 208 K/UL


(150-450)


 


Mean Platelet Volume


 


 7.3 FL


(6.5-10.1)


 


Neutrophils (%) (Auto)


 


 80.0 %


(45.0-75.0)  H


 


Lymphocytes (%) (Auto)


 


 12.9 %


(20.0-45.0)  L


 


Monocytes (%) (Auto)


 


 6.9 %


(1.0-10.0)


 


Eosinophils (%) (Auto)


 


 0.0 %


(0.0-3.0)


 


Basophils (%) (Auto)


 


 0.1 %


(0.0-2.0)


 


Sodium Level


 


 140 MMOL/L


(136-145)


 


Potassium Level


 


 3.6 MMOL/L


(3.5-5.1)


 


Chloride Level


 


 107 MMOL/L


()


 


Carbon Dioxide Level


 


 25 MMOL/L


(21-32)


 


Anion Gap


 


 8 mmol/L


(5-15)


 


Blood Urea Nitrogen


 


 18 mg/dL


(7-18)


 


Creatinine


 


 0.7 MG/DL


(0.55-1.30)


 


Estimat Glomerular Filtration


Rate 


 > 60 mL/min


(>60)


 


Glucose Level


 


 102 MG/DL


()


 


Calcium Level


 


 8.3 MG/DL


(8.5-10.1)  L


 


Phosphorus Level


 


 2.5 MG/DL


(2.5-4.9)


 


Magnesium Level


 


 1.7 MG/DL


(1.8-2.4)  L


 


Total Bilirubin


 


 0.5 MG/DL


(0.2-1.0)


 


Aspartate Amino Transf


(AST/SGOT) 


 26 U/L (15-37)





 


Alanine Aminotransferase


(ALT/SGPT) 


 20 U/L (12-78)





 


Alkaline Phosphatase


 


 189 U/L


()  H


 


Total Protein


 


 5.8 G/DL


(6.4-8.2)  L


 


Albumin


 


 1.7 G/DL


(3.4-5.0)  L


 


Globulin  4.1 g/dL  


 


Albumin/Globulin Ratio


 


 0.4 (1.0-2.7)


L











Plan


Problems:  


(1) Hypoxia


Assessment & Plan:  leukocytosis 


anemia


covid


elevated d dimer


pulm consult


oxygen


steroids?


abx





(2) Bilateral pneumonia


(3) Coronavirus infection, unspecified


(4) Decubitus skin ulcer


Assessment & Plan:  Patient identified admission of multiple decubitus skin 

ulcers and deep tissue injury.  Patient evaluated pictures taken care plan 

initiated nutritional optimization.  Turn every 2 hours hospital protocol ini

tiated for decubitus prevention and care plan.  Apply with recommendations.  

Thank you for let me participate patient's care














Huber Azar                Jan 22, 2021 10:42

## 2021-01-22 NOTE — NUR
Nurse notes

error to prevoius charting.

IV to RFA infusing Morphine 0.5mg/hr. Crawford patent intact draining properly. will continue 
to monitor for care and treatment. DNR/DNI status maintained COVID precautions reinforced  
Bed in lowest position call light in reach.

## 2021-01-23 VITALS — DIASTOLIC BLOOD PRESSURE: 92 MMHG | SYSTOLIC BLOOD PRESSURE: 152 MMHG

## 2021-01-23 VITALS — SYSTOLIC BLOOD PRESSURE: 147 MMHG | DIASTOLIC BLOOD PRESSURE: 68 MMHG

## 2021-01-23 VITALS — SYSTOLIC BLOOD PRESSURE: 128 MMHG | DIASTOLIC BLOOD PRESSURE: 80 MMHG

## 2021-01-23 VITALS — SYSTOLIC BLOOD PRESSURE: 145 MMHG | DIASTOLIC BLOOD PRESSURE: 76 MMHG

## 2021-01-23 VITALS — DIASTOLIC BLOOD PRESSURE: 94 MMHG | SYSTOLIC BLOOD PRESSURE: 127 MMHG

## 2021-01-23 NOTE — PULMONOLOGY PROGRESS NOTE
Subjective


ROS Limited/Unobtainable:  Yes


Interval Events:  now DNR/DNI with s/p terminal extubation


Constitutional:  Reports: no symptoms, other - transferred to Med/surg


HEENT:  Repors: no symptoms


Respiratory:  Reports: dyspnea at rest


Cardiovascular:  Reports: no symptoms


Gastrointestinal/Abdominal:  Reports: no symptoms


Genitourinary:  Reports: no symptoms


Allergies:  


Coded Allergies:  


     No Known Allergies (Unverified , 1/20/21)





Objective





Last 24 Hour Vital Signs








  Date Time  Temp Pulse Resp B/P (MAP) Pulse Ox O2 Delivery O2 Flow Rate FiO2


 


1/23/21 08:00 98.4 96 20 127/94 (105) 96   


 


1/23/21 04:00 96.1 92 20 145/76 (99) 98   


 


1/23/21 00:00 98.0 94 20 147/68 (94) 98   


 


1/22/21 21:00      Non-Rebreather 15.0 





      Non-Rebreather  


 


1/22/21 20:00 98.4 77 20 128/81 (97) 96   


 


1/22/21 16:00 98.4 105 20 129/81 (97) 96   


 


1/22/21 12:00 98.2 93 20 117/77 (90) 98   

















Intake and Output  


 


 1/22/21 1/23/21





 19:00 07:00


 


Output Total  450 ml


 


Balance  -450 ml


 


  


 


Output Urine Total  450 ml








General Appearance:  no acute distress


HEENT:  normocephalic


Respiratory:  chest wall non-tender, respiratory distress


Cardiovascular:  normal peripheral pulses, normal rate


Abdomen:  normal bowel sounds


Extremities:  no cyanosis





Current Medications








 Medications


  (Trade)  Dose


 Ordered  Sig/Kvng


 Route


 PRN Reason  Start Time


 Stop Time Status Last Admin


Dose Admin


 


 Miscellaneous


 Medication


  (Narcotic Drip


 Rate Change)  1 ea  DAILY  PRN


 MISC


 .  1/21/21 18:30


 2/20/21 18:29   





 


 Miscellaneous


 Medication


  (Narcotic Shift


 Volume)  1 ea  Q12HR@0700,1900


 MISC


   1/21/21 19:00


 2/20/21 18:59  1/23/21 07:00





 


 Morphine Sulfate  30 ml @ 


 0.5 mls/hr  PCA Protocol PRN


 IV


 COMFORT CARE  1/21/21 18:15


 1/23/21 18:14  1/21/21 23:47














Assessment/Plan


1. COVID-19 pneumonia with hypoxia; initial testing from outside source


   -now off broad-spectrum antibiotics, dexamethasone 


   - In-house COVID-19 PCR result pending


2. Elevated inflammatory markers


   - Lovenox for DVT prophylaxis


3. Respiratory Failure


   - now on NRB saturating at 96%


   - continue supplemental oxygen to keep SaO2 >90%


Leukocytosis


   - improving 10.9


Anemia


   - stable H&H low


Hyperglycemia


Troponin leak


Elevated BNP


OTTO





DNR/DNI, on Morphine 





The care for this patient was discussed with my supervising physician











Ernst Calvo NP              Jan 23, 2021 12:02

## 2021-01-23 NOTE — NUR
NURSE NOTES:

Remainder of PCA Morphine syringe , removed from  PCA pump and returned to pharmacy, 6 ml 
remaining in syringe. RFA saline lock removed.

## 2021-01-23 NOTE — INTERNAL MED PROGRESS NOTE
Subjective


Physician Name


Shyla Bruno


Attending Physician


Shyla Bruno M.D.





Current Medications








 Medications


  (Trade)  Dose


 Ordered  Sig/Kvng


 Route


 PRN Reason  Start Time


 Stop Time Status Last Admin


Dose Admin


 


 Miscellaneous


 Medication


  (Narcotic Drip


 Rate Change)  1 ea  DAILY  PRN


 MISC


 .  1/21/21 18:30


 2/20/21 18:29   





 


 Miscellaneous


 Medication


  (Narcotic Shift


 Volume)  1 ea  Q12HR@0700,1900


 MISC


   1/21/21 19:00


 2/20/21 18:59  1/23/21 07:00





 


 Morphine Sulfate  30 ml @ 


 0.5 mls/hr  PCA Protocol PRN


 IV


 COMFORT CARE  1/21/21 18:15


 1/23/21 18:14  1/21/21 23:47











Allergies:  


Coded Allergies:  


     No Known Allergies (Unverified , 1/20/21)


Subjective


intubated overnight


discussed with daughter extensively 


would like to transition to comfort care





Objective





Last Vital Signs








  Date Time  Temp Pulse Resp B/P (MAP) Pulse Ox O2 Delivery O2 Flow Rate FiO2


 


1/23/21 08:00 98.4 96 20 127/94 (105) 96   


 


1/22/21 21:00      Non-Rebreather 15.0 





      Non-Rebreather  


 


1/21/21 21:00        40

















Intake and Output  


 


 1/22/21 1/23/21





 19:00 07:00


 


Output Total  450 ml


 


Balance  -450 ml


 


  


 


Output Urine Total  450 ml











Assessment/Plan


Assessment/Plan


#Hypoxemic resp failure due to covid pneumonia


#Covid pneumonia


#Septic shock


#HLD


#h/o hypertension


#h/o dementia


#anemia





- admit to ICU


- intubated 





intubated overnight


discussed with daughter extensively 


would like to transition to comfort care





- on azithro and ceftriaxone


- IVF


- on dex


- defer remdesevir to ID


- ID eval


- cardiology eval


- gen surg eval for decub ulcer 


- monitor lytes 


- monitor hemoglobin











Shyla Bruno M.D.              Jan 23, 2021 14:57

## 2021-01-23 NOTE — NUR
DEATH PRONOUNCEMENT:

No Code.  Called to pronounce patient.  Absence of spontaneous respirations, no cardiac or 
breath sounds on auscultation.  Pupils fixed and dilated.  No carotid pulse or chest 
movement.  Patient  at .  DR Bruno notified PER Randee Charge Rn



Family daughter Janet, was notified by Chong Shafer. Charge Rn Sonja spoke with Janet who does 
not want to visit because of covid, given the number to nursing office to call as 
arrangements have not been made. [facility] notified by [place]. .

-------------------------------------------------------------------------------

Addendum: 21 at 2219 by MARCO ANTONIO De La O

-------------------------------------------------------------------------------

no belongings and pt will be [facility]

## 2021-01-23 NOTE — SURGERY PROGRESS NOTE
Surgery Progress Note


Subjective


Symptoms:  improved





Objective





Last 24 Hour Vital Signs








  Date Time  Temp Pulse Resp B/P (MAP) Pulse Ox O2 Delivery O2 Flow Rate FiO2


 


1/23/21 16:00 98.2 125 18 128/80 (96) 92   


 


1/23/21 12:00 97.7 96 18 152/92 (112) 96   


 


1/23/21 08:00 98.4 96 20 127/94 (105) 96   


 


1/23/21 04:00 96.1 92 20 145/76 (99) 98   


 


1/23/21 00:00 98.0 94 20 147/68 (94) 98   


 


1/22/21 21:00      Non-Rebreather 15.0 





      Non-Rebreather  


 


1/22/21 20:00 98.4 77 20 128/81 (97) 96   








I&O











Intake and Output  


 


 1/22/21 1/23/21





 19:00 07:00


 


Output Total  450 ml


 


Balance  -450 ml


 


  


 


Output Urine Total  450 ml








Dressing:  saturated


Cardiovascular:  RSR


Respiratory:  decreased breath sounds


Abdomen:  non-tender, present bowel sounds


Extremities:  no edema, no tenderness, no cyanosis





Plan


Problems:  


(1) Hypoxia


Assessment & Plan:  leukocytosis 


anemia


covid


elevated d dimer


pulm consult


oxygen


steroids?


abx





(2) Bilateral pneumonia


(3) Coronavirus infection, unspecified


(4) Decubitus skin ulcer


Assessment & Plan:  Patient identified admission of multiple decubitus skin 

ulcers and deep tissue injury.  Patient evaluated pictures taken care plan 

initiated nutritional optimization.  Turn every 2 hours hospital protocol 

initiated for decubitus prevention and care plan.  Apply with recommendations.  

Thank you for let me participate patient's care














Huber Azar                Jan 23, 2021 16:59

## 2021-01-23 NOTE — NUR
NURSE HAND-OFF: 



Important Events on Shift:Patient ,  notified by Randee HOLLIS, House 
Supervisor notified, Sonja HOLLIS notified, Daughter Janet Claudio notified

Patient Status: N/A

Diet: N/A



Pending Orders: N/A

Pending Results/Labs:N/A

Pending MD notification:N/A



Latest Vital Signs: Temperature 98.2 , Pulse 125 , B/P 128 /80 , Respiratory Rate 18 , O2 
SAT 92 , Venturi Mask, O2 Flow Rate 15.0 .  

Vital Sign Comment: N/A



Latest Souza Fall Score: 70  

Fall Risk: High Risk 

Safety Measures: Call light Within Reach, Bed Alarm Zone 1, Side Rails Side Rails x3, Bed 
position Low and Locked.

Fall Precautions: 

Yellow Socks

Yellow Gown

Door Sign

Patient Fall Education



Report given to [place] BARCLAY.

## 2021-01-23 NOTE — CARDIAC ELECTROPHYSIOLOGY PN
Assessment/Plan


Assessment/Plan


1. Elevated troponin of 0.059.  The patient does not have renal failure.


This could be due to COVID pneumonia.  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


myocardial infarction and right bundle-branch block 


 EF 55% on  echocardiogram 





2. Right bundle-branch block.


3. Sinus tachycardia at 113 due to the patient's COVID pneumonia.


4. COVID pneumonia respiratory failure 


       Now extubated and comfort care on morphine drip





CHRYSTAL RN





Subjective


Subjective


Coded for high CO2 and resp failure and was intubated in ICU 


Extubated in covid isolation on morphine drip at 0.5 mg and is comfort care





Objective





Last 24 Hour Vital Signs








  Date Time  Temp Pulse Resp B/P (MAP) Pulse Ox O2 Delivery O2 Flow Rate FiO2


 


1/23/21 04:00 96.1 92 20 145/76 (99) 98   


 


1/23/21 00:00 98.0 94 20 147/68 (94) 98   


 


1/22/21 21:00      Non-Rebreather 15.0 





      Non-Rebreather  


 


1/22/21 20:00 98.4 77 20 128/81 (97) 96   


 


1/22/21 16:00 98.4 105 20 129/81 (97) 96   


 


1/22/21 12:00 98.2 93 20 117/77 (90) 98   

















Intake and Output  


 


 1/22/21 1/23/21





 19:00 07:00


 


Output Total  450 ml


 


Balance  -450 ml


 


  


 


Output Urine Total  450 ml








Objective


HEAD AND NECK:  No JVD. 


LUNGS:  Coarse rhonchi.


CARDIOVASCULAR:  Regular S1 and S2 with no gallop.


ABDOMEN:  Soft.


EXTREMITIES:  No pitting edema.











Meño Capone MD               Jan 23, 2021 10:45

## 2021-01-23 NOTE — NUR
NURSE NOTES:

Spoke with [facility], provided patient admitting diagnosis/COVID + 1/22, and history of 
Dementia, was informed patient is not a candidate as donor. REFERENCE NUMBER [id number]. 
Documented on RECORD OF DEATH form. Given to Sonja MUELLER

## 2021-01-23 NOTE — NUR
CASE MANAGEMENT:REVIEW



1/23/21

SI: COVID PNA. ACUTE RESPIRATORY FAILURE

S/P TERMINAL EXTUBATION

96.1   92  20  145/76  98% ON 15L NRB



IS: MORPHINE GTT

**: MED/SURG STATUS

DCP: [facility]



PLAN:

COMFORT CARE

## 2021-01-23 NOTE — NUR
NURSE NOTES:

Called patient's daughter, Janet Claudio, notified that patient has  and if she/family 
would like to come to see patient for one hour. Janet said she will call her sister and then 
call us back to let us know. Direct number to [facility] nurses station provided. Updated Sonja MUELLER

## 2021-01-23 NOTE — NUR
NURSE NOTES:

Skin care provided, all wound care done to sacral, buttocks/gluteal, knees, 
heels/feet/ankles as instructed by wound nurse. P200 mattress in place, functioning well. 
Repositioned. Linen changed. HR/O2 sat fluctuates, HR 's, O2 sat 93-99% on 15L 
non-rebreather mask, patient monitored continuously. PCA Morphine continuously infusing at 
0.5mg/hr as ordered without difficulty.

## 2021-01-23 NOTE — NUR
NURSE NOTES:

Dr. Bruno notified for new order for PCA Morphine, previous order . Order received 
to renew as previous. PCA Morphine syringe and tubing changed at this time. Setting remains 
at 0.5 mg/hr continuous drip for COMFORT CARE. 

-------------------------------------------------------------------------------

Addendum: 21 at 1920 by Rossy Shafer RN

-------------------------------------------------------------------------------

Upon entering room to change PCA syringe and tubing, patient noted without any respirations 
(O2 15L non-rebreather in place), pale, skin still warm, eyes open, vitals assessed, no HR, 
O2 sat reading, BP noted. Randee HOLLIS (days) and Sonja HOLLIS (nights) notified. IV NS and PCA 
turned off/clamped/disconnected from patient. 

-------------------------------------------------------------------------------

Addendum: 21 at 1954 by Rossy Shafer RN

-------------------------------------------------------------------------------

PCA Morphine not opened, returned to [facility] with Sonja CN, co-signed for return, undone on 
eMAR.

## 2021-01-23 NOTE — NUR
NURSE NOTES:

Spoke with daughter, Janet Claudio regarding patient current status, IV/PCA, FC, skin care, 
NPO, NG clamped, O2, vitals, COVID isolation. Provided direct number to [facility] nurses 
[facility].

## 2022-03-24 NOTE — NUR
Fax Confirmation received. Awaiting determination   NURSE NOTES:

Report received from Lara BARCLAY, rounds made. Patient alert, eyes open, blinks, non-verbal. 
Respirations labored/rapid on O2 15L non-rebreather mask. HOB elevated. Patient edematous to 
upper and lower extremities, pitting +2-3. Does not move arms/legs to touch, or turns. Skin 
precautions, will reposition, throughout shift, will change dressings. FC in place, y/cl. IV 
PCA continuous Morphine drip at 0.5mg/hr with NS TKO 30 ml/hr to RFA, site asymptomatic. 
Remains NPO. Bed in lowest position, will continue to monitor.

## 2023-04-12 NOTE — NUR
NURSE NOTES:

Received patient from Amy BARCLAY. patient opens eyes but no response. sinus rhythm on the 
monitor. ETT 7.5, 22cm at the lip. AC 16  FiO2 100% PEEP 5, tolerating well. fowler in 
place and draining well to gravity. patient currently NPO. LFA 20G and RFA 20G IV site 
patent and intact. D5 1/2 NS running 75ml/hr. bed to lowest position and locked. side rails 
up x2. call light within easy reach. Will continue plan of care. General Sunscreen Counseling: I recommended a broad spectrum sunscreen with a SPF of 30 or higher.  I explained that SPF 30 sunscreens block approximately 97 percent of the sun's harmful rays.  Sunscreens should be applied at least 15 minutes prior to expected sun exposure and then every 2 hours after that as long as sun exposure continues. If swimming or exercising sunscreen should be reapplied every 45 minutes to an hour after getting wet or sweating.  One ounce, or the equivalent of a shot glass full of sunscreen, is adequate to protect the skin not covered by a bathing suit. I also recommended a lip balm with a sunscreen as well. Sun protective clothing can be used in lieu of sunscreen but must be worn the entire time you are exposed to the sun's rays. Products Recommended: Cetaphil, Elta MD, CeraVe AM Detail Level: Detailed